# Patient Record
Sex: FEMALE | NOT HISPANIC OR LATINO | Employment: STUDENT | URBAN - METROPOLITAN AREA
[De-identification: names, ages, dates, MRNs, and addresses within clinical notes are randomized per-mention and may not be internally consistent; named-entity substitution may affect disease eponyms.]

---
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
## 2022-03-02 ENCOUNTER — TELEPHONE (OUTPATIENT)
Dept: FAMILY MEDICINE | Facility: CLINIC | Age: 22
End: 2022-03-02
Payer: COMMERCIAL

## 2022-03-02 NOTE — TELEPHONE ENCOUNTER
Needs of attention regarding:  -Wellness (Physical) Visit     Health Maintenance Topics with due status: Overdue       Topic Date Due    ADVANCE CARE PLANNING Never done    DEPRESSION ACTION PLAN Never done    PREVENTIVE CARE VISIT 09/25/2018    PHQ-9 12/09/2021       Communication:  See Letter

## 2022-03-02 NOTE — LETTER
[address] [address]  [phone]  March 2, 2022    Denisse Lange  [address] [facility]   [place] [address]    Dear Denisse,    This is a reminder that you are due for a Wellness Visit (annual full physical).   So that you get your desired appointment time, please call [phone] to schedule this or you can use [employer] if you have an account. If you have had this visit completed elsewhere, or you believe you received this letter in error, please contact us at [phone].    In addition, here is a list of due or overdue Health Maintenance reminders.    Health Maintenance Due   Topic Date Due     Discuss Advance Care Planning  Never done     Depression Action Plan  Never done     Preventive Care Visit  09/25/2018     Depression Assessment  12/09/2021     Best Regards,    Lela Gomez, [facility]          Jenifer Sherwood M.D.

## 2022-03-17 DIAGNOSIS — F32.0 MILD MAJOR DEPRESSION (H): ICD-10-CM

## 2022-03-17 DIAGNOSIS — F41.9 ANXIETY: ICD-10-CM

## 2022-03-18 NOTE — TELEPHONE ENCOUNTER
Routing refill request to provider for review/approval because:  Labs out of range:  phq  Krishan BANKS RN, BSN

## 2022-03-18 NOTE — TELEPHONE ENCOUNTER
Tristian pt with Dr. Sherwood on Friday 6/3/2022.  The patient attends college in [place] and won't be in [place] until late May.  VB

## 2022-03-18 NOTE — TELEPHONE ENCOUNTER
Pt overdue for follow up.  Please assist pt in making appt(s) for the above - ok for virtual.  Needs to recheck in the next 30 days, please.

## 2022-05-27 ENCOUNTER — VIRTUAL VISIT (OUTPATIENT)
Dept: FAMILY MEDICINE | Facility: CLINIC | Age: 22
End: 2022-05-27
Payer: COMMERCIAL

## 2022-05-27 DIAGNOSIS — R45.4 DIFFICULTY CONTROLLING ANGER: Primary | ICD-10-CM

## 2022-05-27 DIAGNOSIS — F32.0 MILD MAJOR DEPRESSION (H): ICD-10-CM

## 2022-05-27 DIAGNOSIS — F41.9 ANXIETY: ICD-10-CM

## 2022-05-27 PROCEDURE — 99214 OFFICE O/P EST MOD 30 MIN: CPT | Mod: 95 | Performed by: FAMILY MEDICINE

## 2022-05-27 ASSESSMENT — ANXIETY QUESTIONNAIRES
IF YOU CHECKED OFF ANY PROBLEMS ON THIS QUESTIONNAIRE, HOW DIFFICULT HAVE THESE PROBLEMS MADE IT FOR YOU TO DO YOUR WORK, TAKE CARE OF THINGS AT HOME, OR GET ALONG WITH OTHER PEOPLE: SOMEWHAT DIFFICULT
1. FEELING NERVOUS, ANXIOUS, OR ON EDGE: MORE THAN HALF THE DAYS
6. BECOMING EASILY ANNOYED OR IRRITABLE: NEARLY EVERY DAY
3. WORRYING TOO MUCH ABOUT DIFFERENT THINGS: MORE THAN HALF THE DAYS
2. NOT BEING ABLE TO STOP OR CONTROL WORRYING: MORE THAN HALF THE DAYS
7. FEELING AFRAID AS IF SOMETHING AWFUL MIGHT HAPPEN: MORE THAN HALF THE DAYS
GAD7 TOTAL SCORE: 13
GAD7 TOTAL SCORE: 13
5. BEING SO RESTLESS THAT IT IS HARD TO SIT STILL: SEVERAL DAYS

## 2022-05-27 ASSESSMENT — PATIENT HEALTH QUESTIONNAIRE - PHQ9
5. POOR APPETITE OR OVEREATING: SEVERAL DAYS
SUM OF ALL RESPONSES TO PHQ QUESTIONS 1-9: 10
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
## 2022-05-27 NOTE — COMMUNITY RESOURCES LIST (ENGLISH)
05/27/2022   [facility] - Outpatient Clinics  Paz Valentino  For questions about this resource list or additional care needs, please contact your primary care clinic or care manager.  Phone: [phone]   Email: N/A   Address: [address] [place] [address]   Hours: N/A        Hotlines and Helplines       Hotline - Crisis help  1   Department of [facility] Hotline Distance: 16.46 miles      COVID-19 Status: Phone/Virtual   [address] [place] [address]  Language: English  Hours: Mon - Sun Open 24 Hours   Phone: (812) 558-3912 Website: https://www.va.gov/directory/guide/facility.asp?UN=8647     2  [facility] Distance: 21.18 miles      COVID-19 Status: Phone/Virtual   [address] [place] [address]  Language: English  Hours: Mon - Sun Open 24 Hours   Phone: (194) 424-7177 Website: http://www.crisisnursery.org          Mental Health       Individual counseling  3  [facility] Distance: 4.65 miles      COVID-19 Status: Phone/Virtual   [address] [place] [address]  Language: English  Hours: Mon - Thu 8:30 AM - 9:00 PM , Fri 8:30 AM - 5:00 PM  Fees: Insurance, Self Pay   Phone: (153) 305-5483 Email: [email or web address] Website: [email or web address]://[employer]/[facility]/     4  [facility] Distance: 5.45 miles      COVID-19 Status: Regular Operations, COVID-19 Status: Phone/Virtual   [address] [place] [address]  Language: English  Hours: Mon - Fri 7:00 AM - 9:00 PM , Sat - Sun 9:00 AM - 5:00 PM  Fees: Insurance, Self Pay, Sliding Fee   Phone: (964) 863-9400 Email: info@[employer] Website: http://www.[employer]     Mental health crisis care  5  [facility] [place] Distance: 16.59 miles      COVID-19 Status: Regular Operations, COVID-19 Status: Phone/Virtual   [address] [place] [address]  Language: English  Hours: Mon - Thu 8:30 AM - 9:00 PM , Fri 8:30 AM - 5:00 PM , Sat 8:00 AM - 4:00 PM  Fees: Insurance, Self Pay, Sliding Fee   Phone: (773) 140-2152 Email: [email or web address] Website: [email or web address]/locations/greg     6  [facility] Distance: 17.03 miles      COVID-19 Status: Phone/Virtual   [address] [place] [address]  Language: English  Hours: Mon - Fri 8:00 AM - 4:30 PM  Fees: Insurance, Self Pay, Sliding Fee   Phone: (914) 693-3498 Email: [email or web address]. Website: [email or web address]://www.[facility]/290/[facility]     Mental health support group  7  [facility] Distance: 14.5 miles      COVID-19 Status: Phone/Virtual   [address] [address] [place] [address]  Language: English  Hours: Mon - Fri 9:00 AM - 5:00 PM Appt. Only  Fees: Free   Phone: (772) 536-3954 Email: [email or web address] Website: http://www.[email or web address].org/     8  [facility] Distance: 16.76 miles      COVID-19 Status: Phone/Virtual   [address] [place] [address]  Language: English  Hours: Tue - Fri 8:00 AM - 7:00 PM , Sat 9:00 AM - 4:00 PM  Fees: Insurance, Self Pay   Phone: (680) 333-6938 Email: 1Ring@[employer] Website: http://www.[employer].I AM AT/          Important Numbers & Websites       Emergency Services   911  City Services   311  Poison Control   (561) 759-8782  Suicide Prevention Lifeline   (124) 910-7825 (TALK)  Child Abuse Hotline   (788) 315-6132 (4-A-Child)  Sexual Assault Hotline   (247) 579-5826 (HOPE)  National Runaway Safeline   (680) 140-4211 (RUNAWAY)  All-Options Talkline   (591) 867-2659  Substance Abuse Referral   (958) 856-5240 (HELP)

## 2022-07-08 ENCOUNTER — VIRTUAL VISIT (OUTPATIENT)
Dept: FAMILY MEDICINE | Facility: CLINIC | Age: 22
End: 2022-07-08
Payer: COMMERCIAL

## 2022-07-08 DIAGNOSIS — F32.0 MILD MAJOR DEPRESSION (H): ICD-10-CM

## 2022-07-08 DIAGNOSIS — R45.4 DIFFICULTY CONTROLLING ANGER: ICD-10-CM

## 2022-07-08 DIAGNOSIS — F41.9 ANXIETY: ICD-10-CM

## 2022-07-08 PROCEDURE — 99214 OFFICE O/P EST MOD 30 MIN: CPT | Mod: 95 | Performed by: FAMILY MEDICINE

## 2022-07-08 ASSESSMENT — ANXIETY QUESTIONNAIRES
6. BECOMING EASILY ANNOYED OR IRRITABLE: SEVERAL DAYS
5. BEING SO RESTLESS THAT IT IS HARD TO SIT STILL: NOT AT ALL
3. WORRYING TOO MUCH ABOUT DIFFERENT THINGS: SEVERAL DAYS
7. FEELING AFRAID AS IF SOMETHING AWFUL MIGHT HAPPEN: NOT AT ALL
GAD7 TOTAL SCORE: 4
GAD7 TOTAL SCORE: 4
1. FEELING NERVOUS, ANXIOUS, OR ON EDGE: SEVERAL DAYS
2. NOT BEING ABLE TO STOP OR CONTROL WORRYING: SEVERAL DAYS
IF YOU CHECKED OFF ANY PROBLEMS ON THIS QUESTIONNAIRE, HOW DIFFICULT HAVE THESE PROBLEMS MADE IT FOR YOU TO DO YOUR WORK, TAKE CARE OF THINGS AT HOME, OR GET ALONG WITH OTHER PEOPLE: SOMEWHAT DIFFICULT

## 2022-07-08 ASSESSMENT — PATIENT HEALTH QUESTIONNAIRE - PHQ9
5. POOR APPETITE OR OVEREATING: NOT AT ALL
SUM OF ALL RESPONSES TO PHQ QUESTIONS 1-9: 8

## 2022-07-08 NOTE — PROGRESS NOTES
"Fide is a 22 year old who is being evaluated via a billable telephone visit.      What phone number would you like to be contacted at? [phone]  How would you like to obtain your AVS? Mail a copy    Assessment & Plan       ICD-10-CM    1. Mild major depression (H)  F32.0 FLUoxetine (PROZAC) 20 MG capsule   2. Anxiety  F41.9 FLUoxetine (PROZAC) 20 MG capsule   3. Difficulty controlling anger  R45.4 FLUoxetine (PROZAC) 20 MG capsule      Continue fluoxetine 60mg po daily. Pt continues to decline counseling - see below. Please, call our clinic or go to the ER immediately if signs or symptoms worsen or fail to improve as anticipated.     Prescription drug management   20 minutes spent on the date of the encounter doing chart review, history and exam, documentation and further activities per the note       MEDICATIONS:  Continue current medications without change  Regular exercise  See Patient Instructions    Return in about 5 months (around 11/23/2022) for depression/anxiety/anger management  follow up, w/ Dr. ORTEZ for 20 min appt, then px after 1/19/2023 .          Jenifer Sherwood MD  [facility] PRIOR [facility] :   Fide is a 22 year old, presenting for the following health issues:  RECHECK      HPI    Anger management with   Depression and Anxiety Follow-Up: doing well - feeling if she gets annoyed or mad, she's not reacting as strongly and take a second to digest and not react as quickly.  Learning to take a deep breath.  Not as motivated to do '\"stuff\" as much - she helps mom around the house, but puts things off around the house until her mom pushes her.   Sleeping in a lot , not being as motivated to do things still.        How are you doing with your depression since your last visit? Improved     How are you doing with your anxiety since your last visit?  Improved     Are you having other symptoms that might be associated with depression or anxiety? No    Have you had a " significant life event? No     Do you have any concerns with your use of alcohol or other drugs? No     We talked last time about doing counseling , but she hasn't found a good counselor as yet since [facility].    Feels her anger management is a bit improved.  Talking a lot with her long-distance boyfriend who is encouraging her to take a step back and take a moment prior to reacting to things.  Feels her anger management is much improved.   She feels like she is trying to react much less aggressively to stressful situations and put herself in the other person's shoes.      She declined referral for counseling again today.  She started working out to help with her stress management - her boyfriend is a workout guru.  She's not working right now , so that is helping.  She's working on communicating her feelings with words.  Hanging out with her friends more.  Taking more time for herself.    Her mom went back to her home country in [place] and has noticed that pt is getting better with her anger management as well.        She'd  Like to stay with her current medication dosage.  She's heading back to school on 8/6/2022. She goes to school at [employer].        Social History     Tobacco Use     Smoking status: Never Smoker     Smokeless tobacco: Never Used     Tobacco comment: no one smokes in home   Substance Use Topics     Alcohol use: No     Drug use: No     PHQ 6/9/2021 5/27/2022 7/8/2022   PHQ-9 Total Score 19 10 8   Q9: Thoughts of better off dead/self-harm past 2 weeks Several days Not at all Not at all   PHQ-A Total Score - - -   PHQ-A Depressed most days in past year - - -   PHQ-A Mood affect on daily activities - - -   PHQ-A Suicide Ideation past 2 weeks - - -   PHQ-A Suicide Ideation past month - - -   PHQ-A Previous suicide attempt - - -     BEN-7 SCORE 6/9/2021 5/27/2022 7/8/2022   Total Score 10 13 4     Last PHQ-9 7/8/2022   1.  Little  interest or pleasure in doing things 3   2.  Feeling down, depressed, or hopeless 1   3.  Trouble falling or staying asleep, or sleeping too much 2   4.  Feeling tired or having little energy 2   5.  Poor appetite or overeating 0   6.  Feeling bad about yourself 0   7.  Trouble concentrating 0   8.  Moving slowly or restless 0   Q9: Thoughts of better off dead/self-harm past 2 weeks 0   PHQ-9 Total Score 8   Difficulty at work, home, or with people Somewhat difficult     BEN-7  7/8/2022   1. Feeling nervous, anxious, or on edge 1   2. Not being able to stop or control worrying 1   3. Worrying too much about different things 1   4. Trouble relaxing 0   5. Being so restless that it is hard to sit still 0   6. Becoming easily annoyed or irritable 1   7. Feeling afraid, as if something awful might happen 0   BEN-7 Total Score 4   If you checked any problems, how difficult have they made it for you to do your work, take care of things at home, or get along with other people? Somewhat difficult       Suicide Assessment Five-step Evaluation and Treatment (SAFE-T)      How many servings of fruits and vegetables do you eat daily?  2-3    On average, how many sweetened beverages do you drink each day (Examples: soda, juice, sweet tea, etc.  Do NOT count diet or artificially sweetened beverages)?   0    How many days per week do you exercise enough to make your heart beat faster? 3 or less    How many minutes a day do you exercise enough to make your heart beat faster? 30 - 60    How many days per week do you miss taking your medication? 0    Patient Active Problem List   Diagnosis     Tonsillar and adenoid hypertrophy     Dislocated elbow- at age 1.5      Anxiety     Mild major depression (H)     Patellar dislocation, left, initial encounter       Current Outpatient Medications   Medication Sig Dispense Refill     FLUoxetine (PROZAC) 20 MG capsule Take 3 capsules (60 mg) by mouth daily 180 capsule 1        No Known  Allergies            Review of Systems   Constitutional, HEENT, cardiovascular, pulmonary, GI, , musculoskeletal, neuro, skin, endocrine and psych systems are negative, except as otherwise noted.      Objective           Vitals:  No vitals were obtained today due to virtual visit.    Physical Exam   healthy, alert and no distress  PSYCH: Alert and oriented times 3; coherent speech, normal   rate and volume, able to articulate logical thoughts, able   to abstract reason, no tangential thoughts, no hallucinations   or delusions  Her affect is normal  RESP: No cough, no audible wheezing, able to talk in full sentences  Remainder of exam unable to be completed due to telephone visit    Office Visit on 02/27/2017   Component Date Value Ref Range Status     Specimen Description 02/27/2017 Throat   Final     Rapid Strep A Screen 02/27/2017    Final                    Value:NEGATIVE: No Group A streptococcal antigen detected by immunoassay, await   culture report.       Micro Report Status 02/27/2017 FINAL 02/27/2017   Final     Specimen Description 02/27/2017 Throat   Final     Culture Micro 02/27/2017 No Beta Streptococcus isolated   Final     Micro Report Status 02/27/2017 FINAL 03/01/2017   Final               Phone call duration: 15 minutes    .  ..

## 2022-10-13 DIAGNOSIS — F32.0 MILD MAJOR DEPRESSION (H): ICD-10-CM

## 2022-10-13 DIAGNOSIS — R45.4 DIFFICULTY CONTROLLING ANGER: ICD-10-CM

## 2022-10-13 DIAGNOSIS — F41.9 ANXIETY: ICD-10-CM

## 2022-10-13 NOTE — TELEPHONE ENCOUNTER
Routing refill request to provider for review/approval because:  Labs out of range:  PHQ    Qty is for 2 months only. Unsure if 90 day supply was meant to be sent in     Pt also needs a 6 month follow up. Please route to team to schedule    Krishan BANKS RN, BSN

## 2022-10-14 NOTE — TELEPHONE ENCOUNTER
90 days only. Needs appt for followup   Can do this at  annual physical.  Ok to use 2 virtual 20 min spots for this prior to end of year.   Please assist pt in making appt(s) for the above.

## 2022-10-20 NOTE — TELEPHONE ENCOUNTER
Left non-detailed message for patient to call back.  Please schedule annual physical  when patient calls back.  (see previous notes for details)    I have been unable to reach this patient by phone.  A letter is being sent to the last known home address.      Thanks Melany

## 2022-12-23 ENCOUNTER — VIRTUAL VISIT (OUTPATIENT)
Dept: FAMILY MEDICINE | Facility: CLINIC | Age: 22
End: 2022-12-23
Payer: COMMERCIAL

## 2022-12-23 DIAGNOSIS — F41.9 ANXIETY: ICD-10-CM

## 2022-12-23 DIAGNOSIS — F32.0 MILD MAJOR DEPRESSION (H): ICD-10-CM

## 2022-12-23 DIAGNOSIS — R45.4 DIFFICULTY CONTROLLING ANGER: ICD-10-CM

## 2022-12-23 PROCEDURE — 99214 OFFICE O/P EST MOD 30 MIN: CPT | Mod: 95 | Performed by: FAMILY MEDICINE

## 2022-12-23 ASSESSMENT — ANXIETY QUESTIONNAIRES
GAD7 TOTAL SCORE: 16
IF YOU CHECKED OFF ANY PROBLEMS ON THIS QUESTIONNAIRE, HOW DIFFICULT HAVE THESE PROBLEMS MADE IT FOR YOU TO DO YOUR WORK, TAKE CARE OF THINGS AT HOME, OR GET ALONG WITH OTHER PEOPLE: VERY DIFFICULT
4. TROUBLE RELAXING: MORE THAN HALF THE DAYS
3. WORRYING TOO MUCH ABOUT DIFFERENT THINGS: MORE THAN HALF THE DAYS
5. BEING SO RESTLESS THAT IT IS HARD TO SIT STILL: MORE THAN HALF THE DAYS
1. FEELING NERVOUS, ANXIOUS, OR ON EDGE: NEARLY EVERY DAY
6. BECOMING EASILY ANNOYED OR IRRITABLE: NEARLY EVERY DAY
7. FEELING AFRAID AS IF SOMETHING AWFUL MIGHT HAPPEN: MORE THAN HALF THE DAYS
8. IF YOU CHECKED OFF ANY PROBLEMS, HOW DIFFICULT HAVE THESE MADE IT FOR YOU TO DO YOUR WORK, TAKE CARE OF THINGS AT HOME, OR GET ALONG WITH OTHER PEOPLE?: VERY DIFFICULT
2. NOT BEING ABLE TO STOP OR CONTROL WORRYING: MORE THAN HALF THE DAYS
7. FEELING AFRAID AS IF SOMETHING AWFUL MIGHT HAPPEN: MORE THAN HALF THE DAYS

## 2022-12-23 ASSESSMENT — PATIENT HEALTH QUESTIONNAIRE - PHQ9
SUM OF ALL RESPONSES TO PHQ QUESTIONS 1-9: 16
SUM OF ALL RESPONSES TO PHQ QUESTIONS 1-9: 16
10. IF YOU CHECKED OFF ANY PROBLEMS, HOW DIFFICULT HAVE THESE PROBLEMS MADE IT FOR YOU TO DO YOUR WORK, TAKE CARE OF THINGS AT HOME, OR GET ALONG WITH OTHER PEOPLE: VERY DIFFICULT

## 2022-12-23 NOTE — PROGRESS NOTES
Fide is a 22 year old who is being evaluated via a billable video visit.      How would you like to obtain your AVS? MyChart  If the video visit is dropped, the invitation should be resent by: Text to cell phone: [phone]  Will anyone else be joining your video visit? No      Assessment & Plan       ICD-10-CM    1. Mild major depression (H)  F32.0 FLUoxetine (PROZAC) 20 MG capsule     Adult Mental Health  Referral      2. Anxiety  F41.9 FLUoxetine (PROZAC) 20 MG capsule     Adult Mental Health  Referral      3. Difficulty controlling anger  R45.4 FLUoxetine (PROZAC) 20 MG capsule     Adult Mental Health  Referral          Ordering of each unique test  Prescription drug management  30 minutes spent on the date of the encounter doing chart review, history and exam, documentation and further activities per the note      MEDICATIONS:   Orders Placed This Encounter   Medications     FLUoxetine (PROZAC) 20 MG capsule     Sig: Take 4 capsules (80 mg) by mouth daily     Dispense:  360 capsule     Refill:  1          - Continue other medications without change  Regular exercise  See Patient Instructions    Return in 3 weeks (on 1/13/2023) for depression/anxiety follow up, as a video visit, w/ Dr. ORTEZ for 40 minute appointment. prior to pt returning to college at [employer].            Jenifer Sherwood MD  [facility]   Fide is a 22 year old, presenting for the following health issues:  Mental health (Discuss mental health issue and check medication and dosage.)      History of Present Illness       Mental Health Follow-up:  Patient presents to follow-up on Depression & Anxiety.Patient's depression since last visit has been:  Bad  The patient is having other symptoms associated with depression.  Patient's anxiety since last visit has been:  Bad  The patient is having other symptoms associated with anxiety.  Any significant life events: relationship  concerns, grief or loss and other  Patient is feeling anxious or having panic attacks.  Patient has no concerns about alcohol or drug use.    She eats 2-3 servings of fruits and vegetables daily.She consumes 2 sweetened beverage(s) daily.She exercises with enough effort to increase her heart rate 10 to 19 minutes per day.  She exercises with enough effort to increase her heart rate 3 or less days per week.   She is taking medications regularly.    Today's PHQ-9         PHQ-9 Total Score: 16    PHQ-9 Q9 Thoughts of better off dead/self-harm past 2 weeks :   Not at all    How difficult have these problems made it for you to do your work, take care of things at home, or get along with other people: Very difficult  Today's BEN-7 Score: 16       Pt desires referral for counseling/therapy -  pt has concerns for and would like testing for borderline personality disorder.  Other people have brought to her attention that she lashes out at them she doesn't realize it and then shuts down.  Her anger has been really bad lately towards friends and family - verbally.   Hasn't been taking care of herself either.  Is on winter break right now from school at [employer].  counseling/therapy -  pt has concerns for and would like testing for borderline personality disorder -- pt ideally would like someone who is licensed in both [place] and [place] as she goes back on 1/14/2023.     Last PHQ-9 12/23/2022   1.  Little interest or pleasure in doing things 3   2.  Feeling down, depressed, or hopeless 3   3.  Trouble falling or staying asleep, or sleeping too much 3   4.  Feeling tired or having little energy 3   5.  Poor appetite or overeating 0   6.  Feeling bad about yourself 2   7.  Trouble concentrating 2   8.  Moving slowly or restless 0   Q9: Thoughts of better off dead/self-harm past 2 weeks 0   PHQ-9 Total Score 16   Difficulty at work, home, or with people -     BEN-7  12/23/2022   1. Feeling nervous, anxious, or on edge 3   2. Not being  able to stop or control worrying 2   3. Worrying too much about different things 2   4. Trouble relaxing 2   5. Being so restless that it is hard to sit still 2   6. Becoming easily annoyed or irritable 3   7. Feeling afraid, as if something awful might happen 2   BEN-7 Total Score 16   If you checked any problems, how difficult have they made it for you to do your work, take care of things at home, or get along with other people? Very difficult       Patient Active Problem List   Diagnosis     Tonsillar and adenoid hypertrophy     Dislocated elbow- at age 1.5      Anxiety     Mild major depression (H)     Patellar dislocation, left, initial encounter     Difficulty controlling anger       Current Outpatient Medications   Medication Sig Dispense Refill     FLUoxetine (PROZAC) 20 MG capsule TAKE 3 CAPSULES(60 MG) BY MOUTH DAILY 180 capsule 0        No Known Allergies        Review of Systems :   Constitutional, HEENT, cardiovascular, pulmonary, GI, , musculoskeletal, neuro, skin, endocrine and psych systems are negative, except as otherwise noted.      Objective           Vitals:  No vitals were obtained today due to virtual visit.    Physical Exam :  GENERAL: Healthy, alert and no distress  EYES: Eyes grossly normal to inspection.  No discharge or erythema, or obvious scleral/conjunctival abnormalities.  RESP: No audible wheeze, cough, or visible cyanosis.  No visible retractions or increased work of breathing.    SKIN: Visible skin clear. No significant rash, abnormal pigmentation or lesions.  NEURO: Cranial nerves grossly intact.  Mentation and speech appropriate for age.  PSYCH: Mentation appears normal, affect normal/bright, judgement and insight intact, normal speech and appearance well-groomed.    Office Visit on 02/27/2017   Component Date Value Ref Range Status     Specimen Description 02/27/2017 Throat   Final     Rapid Strep A Screen 02/27/2017    Final                    Value:NEGATIVE: No Group A  streptococcal antigen detected by immunoassay, await   culture report.       Micro Report Status 02/27/2017 FINAL 02/27/2017   Final     Specimen Description 02/27/2017 Throat   Final     Culture Micro 02/27/2017 No Beta Streptococcus isolated   Final     Micro Report Status 02/27/2017 FINAL 03/01/2017   Final               Video-Visit Details:     Type of service:  Video Visit   28 minutes on video with patient.     Originating Location (pt. Location): Home  Distant Location (provider location):  On-site  Platform used for Video Visit: Wan

## 2022-12-23 NOTE — PATIENT INSTRUCTIONS
[facility]  [address] [place] [address]  Office: [phone]   Fax:    [phone]     Mental health referral placed. Counseling coverage is very insurance driven. An intake person will call pt , but they  can narrow down possible choices by going to [employer].  In the search field type in therapists or counselors in the the [place] metro area and a bunch of therapists choices will come up with their pictures, backrounds, education and their areas of expertise.     Return in 3 weeks (on 1/13/2023) for depression/anxiety follow up, as a video visit, w/ Dr. ORTEZ for 40 minute appointment.   Future Appointments 12/23/2022 - 6/21/2023        Date Visit Type Length Department Provider     1/13/2023 11:20 AM OFFICE VISIT 40 min [facility] Jenifer Sherwood MD    Location Instructions:     [facility] is located at [address] [place] [address], along [address] Free parking is available; access the lot by turning north from [address] onto [address], then west onto [address].                          Thank you so much for doing a video visit with us today. And, again, thank you  for choosing our [facility].  Please contact us with any questions that you may have.   We appreciate the opportunity to serve you now and look forward to supporting your healthcare needs for a long time to come!    Our clinic for the foreseeable future and until further notice , will continue to offer virtual visits, including telephone visits, video visits,  and e-visits, in addition to in person visits,  especially during this period of COVID-19 coronavirus pandemic.  Please call to schedule another one if you need it!        Most Sincerely,     Jenifer Sherwood MD                                              To reach your [facility] care team after hours call:   [phone]  "press #2 \"to speak with your care team\".  This will get you to our clinic instead of routing to [facility]  scheduling.     PLEASE NOTE OUR HOURS HAVE CHANGED secondary to COVID-19 coronavirus pandemic, as we are trying to minimize patient exposure to the virus,  which is now widespread in the state.  These hours may change with very little notice.  We apologize for any inconvenience.       Our current clinic hours are:         Monday- Thursday   7:00am - 6:00pm  in person.      Friday  7:00am- 5:00pm in person                       Saturday and Sunday : Closed to in person and virtual visits            We have telephone and virtual visit times available between 7:00am - 6pm on             Monday-Friday as well.                                                Phone:  [phone]    Our pharmacy hours: Monday  through Friday 8:00am to 5:00pm                        Saturday - 9:00 am to 12 noon         Sunday : Closed.     ###  Please note: at this time we are not accepting any walk-in visits. ###      There is also information available at our web site:  [email or web address]    If your provider ordered any lab tests and you do not receive the results within 10 business days, please call the clinic.    If you need a medication refill please contact your pharmacy.  Please allow 3 business days for your refill to be completed.    Our clinic offers telephone visits and e visits.  Please ask one of your team members to explain more.      Use [employer] (secure email communication and access to your chart) to send your primary care provider a message or make an appointment. Ask someone on your Team how to sign up for [employer].                                   "

## 2023-01-09 ASSESSMENT — ANXIETY QUESTIONNAIRES
8. IF YOU CHECKED OFF ANY PROBLEMS, HOW DIFFICULT HAVE THESE MADE IT FOR YOU TO DO YOUR WORK, TAKE CARE OF THINGS AT HOME, OR GET ALONG WITH OTHER PEOPLE?: SOMEWHAT DIFFICULT
6. BECOMING EASILY ANNOYED OR IRRITABLE: MORE THAN HALF THE DAYS
4. TROUBLE RELAXING: SEVERAL DAYS
7. FEELING AFRAID AS IF SOMETHING AWFUL MIGHT HAPPEN: SEVERAL DAYS
GAD7 TOTAL SCORE: 7
3. WORRYING TOO MUCH ABOUT DIFFERENT THINGS: SEVERAL DAYS
IF YOU CHECKED OFF ANY PROBLEMS ON THIS QUESTIONNAIRE, HOW DIFFICULT HAVE THESE PROBLEMS MADE IT FOR YOU TO DO YOUR WORK, TAKE CARE OF THINGS AT HOME, OR GET ALONG WITH OTHER PEOPLE: SOMEWHAT DIFFICULT
7. FEELING AFRAID AS IF SOMETHING AWFUL MIGHT HAPPEN: SEVERAL DAYS
GAD7 TOTAL SCORE: 7
5. BEING SO RESTLESS THAT IT IS HARD TO SIT STILL: NOT AT ALL
1. FEELING NERVOUS, ANXIOUS, OR ON EDGE: SEVERAL DAYS
2. NOT BEING ABLE TO STOP OR CONTROL WORRYING: SEVERAL DAYS

## 2023-01-09 ASSESSMENT — PATIENT HEALTH QUESTIONNAIRE - PHQ9: SUM OF ALL RESPONSES TO PHQ QUESTIONS 1-9: 12

## 2023-01-13 ENCOUNTER — VIRTUAL VISIT (OUTPATIENT)
Dept: FAMILY MEDICINE | Facility: CLINIC | Age: 23
End: 2023-01-13
Payer: COMMERCIAL

## 2023-01-13 DIAGNOSIS — F41.9 ANXIETY: ICD-10-CM

## 2023-01-13 DIAGNOSIS — R45.4 DIFFICULTY CONTROLLING ANGER: Primary | ICD-10-CM

## 2023-01-13 DIAGNOSIS — F32.0 MILD MAJOR DEPRESSION (H): ICD-10-CM

## 2023-01-13 PROCEDURE — 99213 OFFICE O/P EST LOW 20 MIN: CPT | Mod: 95 | Performed by: FAMILY MEDICINE

## 2023-01-13 ASSESSMENT — ANXIETY QUESTIONNAIRES
8. IF YOU CHECKED OFF ANY PROBLEMS, HOW DIFFICULT HAVE THESE MADE IT FOR YOU TO DO YOUR WORK, TAKE CARE OF THINGS AT HOME, OR GET ALONG WITH OTHER PEOPLE?: SOMEWHAT DIFFICULT
1. FEELING NERVOUS, ANXIOUS, OR ON EDGE: SEVERAL DAYS
4. TROUBLE RELAXING: SEVERAL DAYS
3. WORRYING TOO MUCH ABOUT DIFFERENT THINGS: MORE THAN HALF THE DAYS
6. BECOMING EASILY ANNOYED OR IRRITABLE: MORE THAN HALF THE DAYS
2. NOT BEING ABLE TO STOP OR CONTROL WORRYING: MORE THAN HALF THE DAYS
GAD7 TOTAL SCORE: 9
GAD7 TOTAL SCORE: 9
IF YOU CHECKED OFF ANY PROBLEMS ON THIS QUESTIONNAIRE, HOW DIFFICULT HAVE THESE PROBLEMS MADE IT FOR YOU TO DO YOUR WORK, TAKE CARE OF THINGS AT HOME, OR GET ALONG WITH OTHER PEOPLE: SOMEWHAT DIFFICULT
7. FEELING AFRAID AS IF SOMETHING AWFUL MIGHT HAPPEN: SEVERAL DAYS
GAD7 TOTAL SCORE: 9
7. FEELING AFRAID AS IF SOMETHING AWFUL MIGHT HAPPEN: SEVERAL DAYS
5. BEING SO RESTLESS THAT IT IS HARD TO SIT STILL: NOT AT ALL

## 2023-01-13 ASSESSMENT — PATIENT HEALTH QUESTIONNAIRE - PHQ9
10. IF YOU CHECKED OFF ANY PROBLEMS, HOW DIFFICULT HAVE THESE PROBLEMS MADE IT FOR YOU TO DO YOUR WORK, TAKE CARE OF THINGS AT HOME, OR GET ALONG WITH OTHER PEOPLE: SOMEWHAT DIFFICULT
SUM OF ALL RESPONSES TO PHQ QUESTIONS 1-9: 13
SUM OF ALL RESPONSES TO PHQ QUESTIONS 1-9: 13

## 2023-01-13 NOTE — PROGRESS NOTES
Fide is a 22 year old who is being evaluated via a billable video visit.      How would you like to obtain your AVS? MyChart  If the video visit is dropped, the invitation should be resent by: Text to cell phone: [phone]  Will anyone else be joining your video visit? No          Assessment & Plan :       ICD-10-CM    1. Difficulty controlling anger  R45.4       2. Mild major depression (H)  F32.0       3. Anxiety  F41.9          Please,  continue your current medications and/or supplements and follow up as we discussed at your last visit.      Prescription drug management  14 minutes spent on the date of the encounter doing chart review, history and exam, documentation and further activities per the note       MEDICATIONS:  Continue current medications without change  Regular exercise  See Patient Instructions    Return in 2 months (on 3/13/2023) for mood and anger , w/ Dr. ORTEZ for 20 minute appointment, as a video visit.           Jenifer Sherwood MD  [facility]   Fide is a 22 year old, presenting for the following health issues:  RECHECK (Mood)    Leaves for school tomorrow am - [employer].   Things have been better.  Keeping busy.  Hasn't been able to get into therapy/counseling as yet.  Calling her insurance to find out who is covered and looking into free counseling services at school as well.      Currently on fluxoetine 80mg daily - no problems or side effects. No anger outbursts.      Things at home good.   History of Present Illness       Mental Health Follow-up:  Patient presents to follow-up on Depression & Anxiety.Patient's depression since last visit has been:  Medium  The patient is not having other symptoms associated with depression.  Patient's anxiety since last visit has been:  Medium  The patient is having other symptoms associated with anxiety.  Any significant life events: No  Patient is not feeling anxious or having panic attacks.  Patient has  no concerns about alcohol or drug use.    She eats 2-3 servings of fruits and vegetables daily.She consumes 2 sweetened beverage(s) daily.She exercises with enough effort to increase her heart rate 9 or less minutes per day.  She exercises with enough effort to increase her heart rate 3 or less days per week.   She is taking medications regularly.    Today's PHQ-9        PHQ-9 Total Score:    PHQ-9 Q9 Thoughts of better off dead/self-harm past 2 weeks :   Not at all    How difficult have these problems made it for you to do your work, take care of things at home, or get along with other people: Somewhat difficult           Social History     Tobacco Use     Smoking status: Never     Smokeless tobacco: Never     Tobacco comments:     no one smokes in home   Vaping Use     Vaping Use: Never used   Substance Use Topics     Alcohol use: No     Drug use: No     PHQ 12/23/2022 1/9/2023 1/13/2023   PHQ-9 Total Score 16 12 13   Q9: Thoughts of better off dead/self-harm past 2 weeks Not at all Not at all Not at all   PHQ-A Total Score - - -   PHQ-A Depressed most days in past year - - -   PHQ-A Mood affect on daily activities - - -   PHQ-A Suicide Ideation past 2 weeks - - -   PHQ-A Suicide Ideation past month - - -   PHQ-A Previous suicide attempt - - -     BEN-7 SCORE 12/23/2022 1/9/2023 1/13/2023   Total Score 16 (severe anxiety) 7 (mild anxiety) 9 (mild anxiety)   Total Score 16 7 9     Last PHQ-9 1/13/2023   1.  Little interest or pleasure in doing things 2   2.  Feeling down, depressed, or hopeless 1   3.  Trouble falling or staying asleep, or sleeping too much 3   4.  Feeling tired or having little energy 3   5.  Poor appetite or overeating 1   6.  Feeling bad about yourself 1   7.  Trouble concentrating 2   8.  Moving slowly or restless 0   Q9: Thoughts of better off dead/self-harm past 2 weeks 0   PHQ-9 Total Score 13   Difficulty at work, home, or with people -     BEN-7  1/13/2023   1. Feeling nervous, anxious, or  on edge 1   2. Not being able to stop or control worrying 2   3. Worrying too much about different things 2   4. Trouble relaxing 1   5. Being so restless that it is hard to sit still 0   6. Becoming easily annoyed or irritable 2   7. Feeling afraid, as if something awful might happen 1   BEN-7 Total Score 9   If you checked any problems, how difficult have they made it for you to do your work, take care of things at home, or get along with other people? Somewhat difficult       Suicide Assessment Five-step Evaluation and Treatment (SAFE-T)    .  Patient Active Problem List   Diagnosis     Tonsillar and adenoid hypertrophy     Dislocated elbow- at age 1.5      Anxiety     Mild major depression (H)     Patellar dislocation, left, initial encounter     Difficulty controlling anger       Current Outpatient Medications   Medication Sig Dispense Refill     FLUoxetine (PROZAC) 20 MG capsule Take 4 capsules (80 mg) by mouth daily 360 capsule 1        No Known Allergies    Review of Systems :   Constitutional, HEENT, cardiovascular, pulmonary, GI, , musculoskeletal, neuro, skin, endocrine and psych systems are negative, except as otherwise noted.      Objective  :      Vitals:  No vitals were obtained today due to virtual visit.    Physical Exam :   GENERAL: Healthy, alert and no distress  EYES: Eyes grossly normal to inspection.  No discharge or erythema, or obvious scleral/conjunctival abnormalities.  RESP: No audible wheeze, cough, or visible cyanosis.  No visible retractions or increased work of breathing.    SKIN: Visible skin clear. No significant rash, abnormal pigmentation or lesions.  NEURO: Cranial nerves grossly intact.  Mentation and speech appropriate for age.  PSYCH: Mentation appears normal, affect normal/bright, judgement and insight intact, normal speech and appearance well-groomed.            Video-Visit Details:     Type of service:  Video Visit   Video Start Time: 11:46am  Video End Time:11:55  JESUS MANUEL    Originating Location (pt. Location): Home    Distant Location (provider location):  On-site  Platform used for Video Visit: Wan

## 2023-01-14 ASSESSMENT — ANXIETY QUESTIONNAIRES: GAD7 TOTAL SCORE: 9

## 2023-01-14 ASSESSMENT — PATIENT HEALTH QUESTIONNAIRE - PHQ9: SUM OF ALL RESPONSES TO PHQ QUESTIONS 1-9: 13

## 2023-03-17 ENCOUNTER — VIRTUAL VISIT (OUTPATIENT)
Dept: FAMILY MEDICINE | Facility: CLINIC | Age: 23
End: 2023-03-17
Payer: COMMERCIAL

## 2023-03-17 DIAGNOSIS — F32.0 MILD MAJOR DEPRESSION (H): ICD-10-CM

## 2023-03-17 DIAGNOSIS — F41.9 ANXIETY: Primary | ICD-10-CM

## 2023-03-17 DIAGNOSIS — R45.4 DIFFICULTY CONTROLLING ANGER: ICD-10-CM

## 2023-03-17 PROCEDURE — 99213 OFFICE O/P EST LOW 20 MIN: CPT | Mod: VID | Performed by: FAMILY MEDICINE

## 2023-03-17 ASSESSMENT — ANXIETY QUESTIONNAIRES
2. NOT BEING ABLE TO STOP OR CONTROL WORRYING: MORE THAN HALF THE DAYS
3. WORRYING TOO MUCH ABOUT DIFFERENT THINGS: MORE THAN HALF THE DAYS
7. FEELING AFRAID AS IF SOMETHING AWFUL MIGHT HAPPEN: SEVERAL DAYS
1. FEELING NERVOUS, ANXIOUS, OR ON EDGE: MORE THAN HALF THE DAYS
GAD7 TOTAL SCORE: 8
IF YOU CHECKED OFF ANY PROBLEMS ON THIS QUESTIONNAIRE, HOW DIFFICULT HAVE THESE PROBLEMS MADE IT FOR YOU TO DO YOUR WORK, TAKE CARE OF THINGS AT HOME, OR GET ALONG WITH OTHER PEOPLE: SOMEWHAT DIFFICULT
5. BEING SO RESTLESS THAT IT IS HARD TO SIT STILL: NOT AT ALL
6. BECOMING EASILY ANNOYED OR IRRITABLE: SEVERAL DAYS
GAD7 TOTAL SCORE: 8

## 2023-03-17 ASSESSMENT — PATIENT HEALTH QUESTIONNAIRE - PHQ9
5. POOR APPETITE OR OVEREATING: NOT AT ALL
SUM OF ALL RESPONSES TO PHQ QUESTIONS 1-9: 12

## 2023-03-17 NOTE — PROGRESS NOTES
Fide is a 23 year old who is being evaluated via a billable video visit.      How would you like to obtain your AVS? MyChart  If the video visit is dropped, the invitation should be resent by: Text to cell phone: [phone]  Will anyone else be joining your video visit? No        Assessment & Plan :       ICD-10-CM    1. Anxiety  F41.9       2. Difficulty controlling anger  R45.4       3. Mild major depression (H)  F32.0           Return in 2 months (on 5/23/2023) for Physical/Preventative Visit, depression/anxiety follow up, w/ Dr. ORTEZ for 40 minute appointment.     Future Appointments 3/17/2023 - 9/13/2023      Date Visit Type Length Department Provider     5/23/2023 10:40 AM PREVENTATIVE ADULT 40 min  FAMILY PRACTICE Jenifer Sherwood MD    Location Instructions:     [facility] is located at [address] [place] [address], along [address] Free parking is available; access the lot by turning north from [address] onto [address], then west onto [facility] [address].                  Ordering of each unique test  Prescription drug management  25 minutes spent on the date of the encounter doing chart review, history and exam, documentation and further activities per the note       MEDICATIONS:  Continue current medications without change  Regular exercise  See Patient Instructions    Return in 2 months (on 5/23/2023) for Physical/Preventative Visit, depression/anxiety follow up, w/ Dr. ORTEZ for 40 minute appointment.        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    Looking into doing counseling at school. Hasn't done that yet.  Getting some weird zinging feelings in her scalp for a  "second and then its gone. \"like a reset\" then its gone - not painful.  Doesn't bother her.      Is sleeping really well.   Still no problems or side effects . No anger outbursts.  Pt feels really good where she is and doesn't want to add any medications or change anything.         From last visit 1/13/2023 - Things have been better.  Keeping busy.  Hasn't been able to get into therapy/counseling as yet.  Calling her insurance to find out who is covered and looking into free counseling services at school as well.       Currently on fluxoetine 80mg daily - no problems or side effects. No anger outbursts.       Things at home good. Giving herself more alone time.   HPI     Depression and Anxiety Follow-Up:     How are you doing with your depression since your last visit? No change    How are you doing with your anxiety since your last visit?  No change    Are you having other symptoms that might be associated with depression or anxiety? No    Have you had a significant life event? No     Do you have any concerns with your use of alcohol or other drugs? No    Social History     Tobacco Use     Smoking status: Never     Smokeless tobacco: Never     Tobacco comments:     no one smokes in home   Vaping Use     Vaping Use: Never used   Substance Use Topics     Alcohol use: No     Drug use: No     PHQ 1/13/2023 3/12/2023 3/17/2023   PHQ-9 Total Score 13 11 12   Q9: Thoughts of better off dead/self-harm past 2 weeks Not at all Not at all Not at all   PHQ-A Total Score - - -   PHQ-A Depressed most days in past year - - -   PHQ-A Mood affect on daily activities - - -   PHQ-A Suicide Ideation past 2 weeks - - -   PHQ-A Suicide Ideation past month - - -   PHQ-A Previous suicide attempt - - -     BEN-7 SCORE 1/13/2023 3/12/2023 3/17/2023   Total Score 9 (mild anxiety) 5 (mild anxiety) -   Total Score 9 5 8     Last PHQ-9 3/17/2023   1.  Little interest or pleasure in doing things 2   2.  Feeling down, depressed, or hopeless 1   3. "  Trouble falling or staying asleep, or sleeping too much 2   4.  Feeling tired or having little energy 2   5.  Poor appetite or overeating 0   6.  Feeling bad about yourself 0   7.  Trouble concentrating 2   8.  Moving slowly or restless 3   Q9: Thoughts of better off dead/self-harm past 2 weeks 0   PHQ-9 Total Score 12   Difficulty at work, home, or with people Somewhat difficult     BEN-7  3/17/2023   1. Feeling nervous, anxious, or on edge 2   2. Not being able to stop or control worrying 2   3. Worrying too much about different things 2   4. Trouble relaxing 0   5. Being so restless that it is hard to sit still 0   6. Becoming easily annoyed or irritable 1   7. Feeling afraid, as if something awful might happen 1   BEN-7 Total Score 8   If you checked any problems, how difficult have they made it for you to do your work, take care of things at home, or get along with other people? Somewhat difficult       Suicide Assessment Five-step Evaluation and Treatment (SAFE-T)    Patient Active Problem List   Diagnosis     Tonsillar and adenoid hypertrophy     Dislocated elbow- at age 1.5      Anxiety     Mild major depression (H)     Patellar dislocation, left, initial encounter     Difficulty controlling anger       Current Outpatient Medications   Medication Sig Dispense Refill     FLUoxetine (PROZAC) 20 MG capsule Take 4 capsules (80 mg) by mouth daily 360 capsule 1        No Known Allergies         Review of Systems :   Constitutional, HEENT, cardiovascular, pulmonary, GI, , musculoskeletal, neuro, skin, endocrine and psych systems are negative, except as otherwise noted.      Objective           Vitals:  No vitals were obtained today due to virtual visit.    Physical Exam   GENERAL: Healthy, alert and no distress  EYES: Eyes grossly normal to inspection.  No discharge or erythema, or obvious scleral/conjunctival abnormalities.  RESP: No audible wheeze, cough, or visible cyanosis.  No visible retractions or  increased work of breathing.    SKIN: Visible skin clear. No significant rash, abnormal pigmentation or lesions.  NEURO: Cranial nerves grossly intact.  Mentation and speech appropriate for age.  PSYCH: Mentation appears normal, affect normal/bright, judgement and insight intact, normal speech and appearance well-groomed.                Video-Visit Details    Type of service:  Video Visit   Video Start Time: 5:13PM  Video End Time:5:31PM    Total time on video : 18 minutes     Originating Location (pt. Location): Home  Distant Location (provider location):  On-site  Platform used for Video Visit: Wan

## 2023-03-17 NOTE — PATIENT INSTRUCTIONS
" [facility]  [address] [place] [address]  Office: [phone]   Fax:    [phone]     [employer] Podcast on Spot[employer].         Return in 2 months (on 5/23/2023) for Physical/Preventative Visit, depression/anxiety follow up, w/ Dr. ORTEZ for 40 minute appointment.    Future Appointments 3/17/2023 - 9/13/2023        Date Visit Type Length Department Provider     5/23/2023 10:40 AM PREVENTATIVE ADULT 40 min [facility] Jenifer Sherwood MD    Location Instructions:     [facility] is located at [address], along [address]. Free parking is available; access [address], then west onto [address].                               Thank you so much for doing a video visit with us today. And, again, thank you  for choosing our [facility].  Please contact us with any questions that you may have.   We appreciate the opportunity to serve you now and look forward to supporting your healthcare needs for a long time to come!    Our clinic for the foreseeable future and until further notice , will continue to offer virtual visits, including telephone visits, video visits,  and e-visits, in addition to in person visits,  especially during this period of COVID-19 coronavirus pandemic.  Please call to schedule another one if you need it!        Most Sincerely,     Jenifer Sherwood MD                                              To reach your [facility] care team after hours call:   [phone] press #[phone] \"to speak with your care team\".  This will get you to our clinic instead of routing to central [facility]  scheduling.     PLEASE NOTE OUR HOURS HAVE CHANGED secondary to COVID-19 coronavirus pandemic, as we are trying to minimize patient exposure to the virus,  which is now widespread in the " state.  These hours may change with very little notice.  We apologize for any inconvenience.       Our current clinic hours are:         Monday- Thursday   7:00am - 6:00pm  in person.      Friday  7:00am- 5:00pm in person                       Saturday and Sunday : Closed to in person and virtual visits            We have telephone and virtual visit times available between 7:00am - 6pm on             Monday-Friday as well.                                                Phone:  [phone]    Our pharmacy hours: Monday  through Friday 8:00am to 5:00pm                        Saturday - 9:00 am to 12 noon         Sunday : Closed.     ###  Please note: at this time we are not accepting any walk-in visits. ###      There is also information available at our web site:  [email or web address]    If your provider ordered any lab tests and you do not receive the results within 10 business days, please call the clinic.    If you need a medication refill please contact your pharmacy.  Please allow 3 business days for your refill to be completed.    Our clinic offers telephone visits and e visits.  Please ask one of your team members to explain more.      Use [employer] (secure email communication and access to your chart) to send your primary care provider a message or make an appointment. Ask someone on your Team how to sign up for [employer].

## 2023-04-15 ENCOUNTER — HEALTH MAINTENANCE LETTER (OUTPATIENT)
Age: 23
End: 2023-04-15

## 2023-05-22 ASSESSMENT — ENCOUNTER SYMPTOMS
DYSURIA: 0
SHORTNESS OF BREATH: 0
NERVOUS/ANXIOUS: 1
DIZZINESS: 1
NAUSEA: 0
CHILLS: 0
HEADACHES: 1
ARTHRALGIAS: 0
JOINT SWELLING: 0
FREQUENCY: 0
CONSTIPATION: 0
BREAST MASS: 0
WEAKNESS: 0
HEMATOCHEZIA: 0
ABDOMINAL PAIN: 0
HEARTBURN: 0
HEMATURIA: 0
FEVER: 0
COUGH: 0
MYALGIAS: 0
PALPITATIONS: 0
EYE PAIN: 0
DIARRHEA: 0
PARESTHESIAS: 0
SORE THROAT: 0

## 2023-05-22 ASSESSMENT — PATIENT HEALTH QUESTIONNAIRE - PHQ9
SUM OF ALL RESPONSES TO PHQ QUESTIONS 1-9: 12
SUM OF ALL RESPONSES TO PHQ QUESTIONS 1-9: 12
10. IF YOU CHECKED OFF ANY PROBLEMS, HOW DIFFICULT HAVE THESE PROBLEMS MADE IT FOR YOU TO DO YOUR WORK, TAKE CARE OF THINGS AT HOME, OR GET ALONG WITH OTHER PEOPLE: EXTREMELY DIFFICULT

## 2023-05-23 ENCOUNTER — OFFICE VISIT (OUTPATIENT)
Dept: FAMILY MEDICINE | Facility: CLINIC | Age: 23
End: 2023-05-23
Payer: COMMERCIAL

## 2023-05-23 VITALS
DIASTOLIC BLOOD PRESSURE: 73 MMHG | OXYGEN SATURATION: 100 % | SYSTOLIC BLOOD PRESSURE: 106 MMHG | WEIGHT: 126.2 LBS | RESPIRATION RATE: 14 BRPM | HEART RATE: 77 BPM | TEMPERATURE: 98.4 F | HEIGHT: 64 IN | BODY MASS INDEX: 21.54 KG/M2

## 2023-05-23 DIAGNOSIS — Z23 NEED FOR COVID-19 VACCINE: ICD-10-CM

## 2023-05-23 DIAGNOSIS — Z00.01 ENCOUNTER FOR ROUTINE ADULT MEDICAL EXAM WITH ABNORMAL FINDINGS: Primary | ICD-10-CM

## 2023-05-23 DIAGNOSIS — Z30.011 ENCOUNTER FOR INITIAL PRESCRIPTION OF CONTRACEPTIVE PILLS: ICD-10-CM

## 2023-05-23 DIAGNOSIS — F33.0 MILD RECURRENT MAJOR DEPRESSION (H): ICD-10-CM

## 2023-05-23 DIAGNOSIS — Z11.3 SCREEN FOR STD (SEXUALLY TRANSMITTED DISEASE): ICD-10-CM

## 2023-05-23 DIAGNOSIS — R45.4 DIFFICULTY CONTROLLING ANGER: ICD-10-CM

## 2023-05-23 DIAGNOSIS — N94.6 DYSMENORRHEA: ICD-10-CM

## 2023-05-23 DIAGNOSIS — F41.9 ANXIETY: ICD-10-CM

## 2023-05-23 DIAGNOSIS — Z12.4 CERVICAL CANCER SCREENING: ICD-10-CM

## 2023-05-23 PROBLEM — S83.005A PATELLAR DISLOCATION, LEFT, INITIAL ENCOUNTER: Status: ACTIVE | Noted: 2021-12-23

## 2023-05-23 LAB
HCG UR QL: NEGATIVE
HGB BLD-MCNC: 13.3 G/DL (ref 11.7–15.7)

## 2023-05-23 PROCEDURE — 99395 PREV VISIT EST AGE 18-39: CPT | Mod: 25 | Performed by: FAMILY MEDICINE

## 2023-05-23 PROCEDURE — 87491 CHLMYD TRACH DNA AMP PROBE: CPT | Performed by: FAMILY MEDICINE

## 2023-05-23 PROCEDURE — 81025 URINE PREGNANCY TEST: CPT | Performed by: FAMILY MEDICINE

## 2023-05-23 PROCEDURE — 85018 HEMOGLOBIN: CPT | Performed by: FAMILY MEDICINE

## 2023-05-23 PROCEDURE — 96127 BRIEF EMOTIONAL/BEHAV ASSMT: CPT | Performed by: FAMILY MEDICINE

## 2023-05-23 PROCEDURE — 91313 COVID-19 BIVALENT 18+ (MODERNA): CPT | Performed by: FAMILY MEDICINE

## 2023-05-23 PROCEDURE — G0145 SCR C/V CYTO,THINLAYER,RESCR: HCPCS | Performed by: FAMILY MEDICINE

## 2023-05-23 PROCEDURE — 99214 OFFICE O/P EST MOD 30 MIN: CPT | Mod: 25 | Performed by: FAMILY MEDICINE

## 2023-05-23 PROCEDURE — 36415 COLL VENOUS BLD VENIPUNCTURE: CPT | Performed by: FAMILY MEDICINE

## 2023-05-23 PROCEDURE — 87591 N.GONORRHOEAE DNA AMP PROB: CPT | Performed by: FAMILY MEDICINE

## 2023-05-23 PROCEDURE — 0134A COVID-19 BIVALENT 18+ (MODERNA): CPT | Performed by: FAMILY MEDICINE

## 2023-05-23 RX ORDER — LEVONORGESTREL/ETHIN.ESTRADIOL 0.1-0.02MG
1 TABLET ORAL DAILY
Qty: 84 TABLET | Refills: 3 | Status: SHIPPED | OUTPATIENT
Start: 2023-05-23 | End: 2023-08-10

## 2023-05-23 RX ORDER — ESCITALOPRAM OXALATE 5 MG/1
5 TABLET ORAL DAILY
Qty: 30 TABLET | Refills: 5 | Status: SHIPPED | OUTPATIENT
Start: 2023-05-23 | End: 2023-06-28

## 2023-05-23 ASSESSMENT — ANXIETY QUESTIONNAIRES
1. FEELING NERVOUS, ANXIOUS, OR ON EDGE: SEVERAL DAYS
3. WORRYING TOO MUCH ABOUT DIFFERENT THINGS: MORE THAN HALF THE DAYS
GAD7 TOTAL SCORE: 9
8. IF YOU CHECKED OFF ANY PROBLEMS, HOW DIFFICULT HAVE THESE MADE IT FOR YOU TO DO YOUR WORK, TAKE CARE OF THINGS AT HOME, OR GET ALONG WITH OTHER PEOPLE?: VERY DIFFICULT
7. FEELING AFRAID AS IF SOMETHING AWFUL MIGHT HAPPEN: SEVERAL DAYS
2. NOT BEING ABLE TO STOP OR CONTROL WORRYING: SEVERAL DAYS
GAD7 TOTAL SCORE: 9
7. FEELING AFRAID AS IF SOMETHING AWFUL MIGHT HAPPEN: SEVERAL DAYS
4. TROUBLE RELAXING: SEVERAL DAYS
GAD7 TOTAL SCORE: 9
IF YOU CHECKED OFF ANY PROBLEMS ON THIS QUESTIONNAIRE, HOW DIFFICULT HAVE THESE PROBLEMS MADE IT FOR YOU TO DO YOUR WORK, TAKE CARE OF THINGS AT HOME, OR GET ALONG WITH OTHER PEOPLE: VERY DIFFICULT
6. BECOMING EASILY ANNOYED OR IRRITABLE: MORE THAN HALF THE DAYS
5. BEING SO RESTLESS THAT IT IS HARD TO SIT STILL: SEVERAL DAYS

## 2023-05-23 ASSESSMENT — ENCOUNTER SYMPTOMS
BREAST MASS: 0
DYSURIA: 0
NAUSEA: 0
HEADACHES: 1
EYE PAIN: 0
NERVOUS/ANXIOUS: 1
WEAKNESS: 0
CONSTIPATION: 0
HEMATURIA: 0
SHORTNESS OF BREATH: 0
JOINT SWELLING: 0
HEMATOCHEZIA: 0
PARESTHESIAS: 0
ARTHRALGIAS: 0
CHILLS: 0
DIZZINESS: 1
COUGH: 0
HEARTBURN: 0
DIARRHEA: 0
FEVER: 0
FREQUENCY: 0
ABDOMINAL PAIN: 0
SORE THROAT: 0
MYALGIAS: 0
PALPITATIONS: 0

## 2023-05-23 NOTE — PROGRESS NOTES
"   SUBJECTIVE:   CC: Fide is an 23 year old who presents for preventive health visit. and the following other medical problems:     1. Encounter for routine adult medical exam with abnormal findings    2. Cervical cancer screening    3. Need for COVID-19 vaccine    4. Screen for STD (sexually transmitted disease)    5. Anxiety- desires to switch from fluoxetine - not effective for her - will try escitalopram instead     6. Difficulty controlling anger    7. Dysmenorrhea    8. Encounter for initial prescription of contraceptive pills    9. Mild recurrent major depression (H)    Answers for HPI/ROS submitted by the patient on 5/22/2023  If you checked off any problems, how difficult have these problems made it for you to do your work, take care of things at home, or get along with other people?: Extremely difficult  PHQ9 TOTAL SCORE: 12  BEN 7 TOTAL SCORE: 9            5/23/2023     9:45 AM   Additional Questions   Roomed by Lynette Addison   Accompanied by Self   Patient has been advised of split billing requirements and indicates understanding: Yes  Healthy Habits:     Getting at least 3 servings of Calcium per day:  NO    Bi-annual eye exam:  Yes    Dental care twice a year:  Yes    Sleep apnea or symptoms of sleep apnea:  None    Diet:  Regular (no restrictions)    Frequency of exercise:  2-3 days/week    Duration of exercise:  45-60 minutes    Taking medications regularly:  Yes    Medication side effects:  None    PHQ-2 Total Score: 4    Additional concerns today:  No              Depression and Anxiety Follow-Up    How are you doing with your depression since your last visit? Worsened, Fide would like to discuss a possible medication change.    How are you doing with your anxiety since your last visit?  Worsened, \"   \"    Are you having other symptoms that might be associated with depression or anxiety? No    Have you had a significant life event? No     Do you have any concerns with your use of alcohol or other " drugs? No    Social History     Tobacco Use     Smoking status: Never     Smokeless tobacco: Never     Tobacco comments:     no one smokes in home   Vaping Use     Vaping status: Never Used   Substance Use Topics     Alcohol use: Yes     Drug use: No         3/12/2023    11:50 PM 3/17/2023     4:38 PM 5/22/2023     8:58 PM   PHQ   PHQ-9 Total Score 11 12 12   Q9: Thoughts of better off dead/self-harm past 2 weeks Not at all Not at all Not at all         1/13/2023    10:39 AM 3/12/2023    11:48 PM 3/17/2023     4:38 PM   BEN-7 SCORE   Total Score 9 (mild anxiety) 5 (mild anxiety)    Total Score 9 5 8         5/22/2023     8:58 PM   Last PHQ-9   1.  Little interest or pleasure in doing things 2   2.  Feeling down, depressed, or hopeless 2   3.  Trouble falling or staying asleep, or sleeping too much 2   4.  Feeling tired or having little energy 2   5.  Poor appetite or overeating 1   6.  Feeling bad about yourself 1   7.  Trouble concentrating 2   8.  Moving slowly or restless 0   Q9: Thoughts of better off dead/self-harm past 2 weeks 0   PHQ-9 Total Score 12         3/17/2023     4:38 PM   BEN-7    1. Feeling nervous, anxious, or on edge 2   2. Not being able to stop or control worrying 2   3. Worrying too much about different things 2   4. Trouble relaxing 0   5. Being so restless that it is hard to sit still 0   6. Becoming easily annoyed or irritable 1   7. Feeling afraid, as if something awful might happen 1   BEN-7 Total Score 8   If you checked any problems, how difficult have they made it for you to do your work, take care of things at home, or get along with other people? Somewhat difficult       Suicide Assessment Five-step Evaluation and Treatment (SAFE-T)      Have you ever done Advance Care Planning? (For example, a Health Directive, POLST, or a discussion with a medical provider or your loved ones about your wishes): No, advance care planning information given to patient to review.  Patient plans to  discuss their wishes with loved ones or provider.      Social History     Tobacco Use     Smoking status: Never     Smokeless tobacco: Never     Tobacco comments:     no one smokes in home   Vaping Use     Vaping status: Never Used   Substance Use Topics     Alcohol use: Yes           2023     9:01 PM   Alcohol Use   Prescreen: >3 drinks/day or >7 drinks/week? No     Reviewed orders with patient.  Reviewed health maintenance and updated orders accordingly - Yes  Lab work is in process  Labs reviewed in EPIC  BP Readings from Last 3 Encounters:   23 106/73   21 104/71   19 100/60 (14 %, Z = -1.08 /  28 %, Z = -0.58)*     *BP percentiles are based on the 2017 AAP Clinical Practice Guideline for girls    Wt Readings from Last 3 Encounters:   23 57.2 kg (126 lb 3.2 oz)   21 62.2 kg (137 lb 1.6 oz) (65 %, Z= 0.38)*   19 56.2 kg (124 lb) (51 %, Z= 0.02)*     * Growth percentiles are based on [facility] (Girls, 2-20 Years) data.                  Patient Active Problem List   Diagnosis     Anxiety     Mild major depression (H)     Patellar dislocation, left, initial encounter- first visit to clinic = 2021 , had recurrent of same as a child      Difficulty controlling anger     Past Surgical History:   Procedure Laterality Date     EXCISE LESION NECK  2012    Procedure: EXCISE LESION NECK;  Excision Lipoma Posterior Neck ;  Surgeon: Aureliano Henderson MD;  Location: [facility] OR     TONSILLECTOMY         Social History     Tobacco Use     Smoking status: Never     Smokeless tobacco: Never     Tobacco comments:     no one smokes in home   Vaping Use     Vaping status: Never Used   Substance Use Topics     Alcohol use: Yes     Family History   Problem Relation Age of Onset     Cerebrovascular Disease Paternal Grandfather         HEMORRHAGIC STROKE FROM ANEURYSM AT AGE 55 -      Family History Negative Mother      Diabetes Maternal Grandmother          Current Outpatient  Medications   Medication Sig Dispense Refill     escitalopram (LEXAPRO) 5 MG tablet Take 1 tablet (5 mg) by mouth daily 30 tablet 5     levonorgestrel-ethinyl estradiol (AVIANE) 0.1-20 MG-MCG tablet Take 1 tablet by mouth daily 84 tablet 3     No Known Allergies  No lab results found.     Breast Cancer Screening:        History of abnormal Pap smear: NO - age 21-29 PAP every 3 years recommended     Reviewed and updated as needed this visit by clinical staff                  Reviewed and updated as needed this visit by Provider                 Past Medical History:   Diagnosis Date     Anxiety 12/18/2019     Depressive disorder 2014     Dislocated elbow- at age 1.5  2/17/2012     Lipoma of neck     posterior     Mild major depression (H) 12/18/2019    paroxetine - too fatigued during the day       Past Surgical History:   Procedure Laterality Date     EXCISE LESION NECK  08/27/2012    Procedure: EXCISE LESION NECK;  Excision Lipoma Posterior Neck ;  Surgeon: Aureliano Henderson MD;  Location: [facility] OR     TONSILLECTOMY  2008     OB History   No obstetric history on file.       Review of Systems   Constitutional: Negative for chills and fever.   HENT: Negative for congestion, ear pain, hearing loss and sore throat.    Eyes: Negative for pain and visual disturbance.   Respiratory: Negative for cough and shortness of breath.    Cardiovascular: Negative for chest pain, palpitations and peripheral edema.   Gastrointestinal: Negative for abdominal pain, constipation, diarrhea, heartburn, hematochezia and nausea.   Breasts:  Negative for tenderness, breast mass and discharge.   Genitourinary: Negative for dysuria, frequency, genital sores, hematuria, pelvic pain, urgency, vaginal bleeding and vaginal discharge.   Musculoskeletal: Negative for arthralgias, joint swelling and myalgias.   Skin: Negative for rash.   Neurological: Positive for dizziness and headaches. Negative for weakness and paresthesias.  "  Psychiatric/Behavioral: Negative for mood changes. The patient is nervous/anxious.           OBJECTIVE:   /73   Pulse 77   Temp 98.4  F (36.9  C) (Tympanic)   Resp 14   Ht 1.626 m (5' 4\")   Wt 57.2 kg (126 lb 3.2 oz)   LMP 04/23/2023 (Exact Date)   SpO2 100%   BMI 21.66 kg/m    Physical Exam  GENERAL: healthy, alert and no distress  EYES: Eyes grossly normal to inspection, PERRL and conjunctivae and sclerae normal  HENT: ear canals and TM's normal, nose and mouth without ulcers or lesions  NECK: no adenopathy, no asymmetry, masses, or scars and thyroid normal to palpation  RESP: lungs clear to auscultation - no rales, rhonchi or wheezes  BREAST: normal without masses, tenderness or nipple discharge and no palpable axillary masses or adenopathy  CV: regular rate and rhythm, normal S1 S2, no S3 or S4, no murmur, click or rub, no peripheral edema and peripheral pulses strong  ABDOMEN: soft, nontender, no hepatosplenomegaly, no masses and bowel sounds normal   (female): normal female external genitalia, normal urethral meatus, vaginal mucosa pink, moist, well rugated, and normal cervix/adnexa/uterus without masses or discharge  MS: no gross musculoskeletal defects noted, no edema  SKIN: no suspicious lesions or rashes  NEURO: Normal strength and tone, mentation intact and speech normal  PSYCH: mentation appears normal, affect normal/bright  Full bladder mildly interfered with bimanual exam today.  Ovaries and contours of uterus not well palpated.        Diagnostic Test Results:  Labs reviewed in Epic    ASSESSMENT/PLAN:       ICD-10-CM    1. Encounter for routine adult medical exam with abnormal findings  Z00.01 Hemoglobin     Hemoglobin      2. Cervical cancer screening  Z12.4 Pap Screen only - recommended age 21 - 24 years      3. Need for COVID-19 vaccine  Z23 COVID-19 BIVALENT 18+ (MODERNA)      4. Screen for STD (sexually transmitted disease)  Z11.3 NEISSERIA GONORRHOEA PCR     CHLAMYDIA " TRACHOMATIS PCR      5. Anxiety- desires to switch from fluoxetine - not effective for her - will try escitalopram instead   F41.9 escitalopram (LEXAPRO) 5 MG tablet      6. Difficulty controlling anger  R45.4       7. Dysmenorrhea  N94.6 levonorgestrel-ethinyl estradiol (AVIANE) 0.1-20 MG-MCG tablet      8. Encounter for initial prescription of contraceptive pills  Z30.011 levonorgestrel-ethinyl estradiol (AVIANE) 0.1-20 MG-MCG tablet     HCG qualitative urine      9. Mild recurrent major depression (H)  F33.0              Patient has been advised of split billing requirements and indicates understanding: Yes      COUNSELING:  Reviewed preventive health counseling, as reflected in patient instructions  Pt would like to get started on ocp's as well to help her periods and  Is sexually active.    Check urine HCG today. Discussed day 5 start for first ocp pack.  See AVS.     Return in about 5 weeks (around 6/28/2023) for depression/anxiety, as a video visit, w/ Dr Sherwood.     She reports that she has never smoked. She has never used smokeless tobacco.           Jenifer Sherwood MD  [facility] [place]

## 2023-05-23 NOTE — PATIENT INSTRUCTIONS
[facility]  [address] [place] [address]  Office: [phone]   Fax:    [phone]       To prevent and/or treat mild side effects (body aches, chills, fever, fatigue) -not related to allergies - after receiving COVID-19 novel coronavirus vaccine or other vaccines :     Go into your shots very well hydrated and stay well hydrated for 3-4 days afterwards.     Don't take the Ibuprofen prior to your shot, but rather afterward.  No prescription steroids within 2 weeks of the shots as they can suppress your immune system, but Ibuprofen is not a significant immunosuppressant at the 400mg dosage.   Ok to take tylenol 2-325 or 2-500mg tabs prior to vaccine.    Take claritin (a non-sedating anti-histamine) 10 mg daily for 2 days and 2 over the counter 200mg  Ibuprofen pills every 4-6 hours with food while awake for the next 2-3 days .  Take them with food so they don't irritate your stomach.  You can do the above regimen for 2-3 days after your first,  second, or third  shots to decrease the possibility of achiness, fever, chills after your COVID-19 novel coronavirus or any current vaccines.      Wait until your next period to start your pills/ patches - first day of next menstrual period will be day #1,  Take your pill or Put on your patch on day #5, then next cycle take your pill or put your patch on day #7 and always restart active pills/patches on Day#7 of your placebo  Pills in  pack regardless of whether your period is done or no.     If you are running your pill packs together to get a period every 3 months - take only the active pills in the first 3 rows for 4 packs, then take placebo week at the end of the 4th pack to get period.  Start next active pack on the 7th day of the sugar pills- take an active pill from next pack instead of the 7th sugar pill.       .     Preventive Health Recommendations  Female Ages 21 to 25     Yearly exam:   See your health care provider every  "year in order to  Review health changes.   Discuss preventive care.    Review your medicines if your doctor has prescribed any.    You should be tested each year for STDs (sexually transmitted diseases).     Talk to your provider about how often you should have cholesterol testing.    Get a Pap test every three years. If you have an abnormal result, your doctor may have you test more often.    If you are at risk for diabetes, you should have a diabetes test (fasting glucose).     Shots:   Get a flu shot each year.   Get a tetanus shot every 10 years.   Consider getting the shot (vaccine) that prevents cervical cancer (Gardasil).    Nutrition:   Eat at least 5 servings of fruits and vegetables each day.  Eat whole-grain bread, whole-wheat pasta and brown rice instead of white grains and rice.  Get adequate Calcium and Vitamin D.     Lifestyle  Exercise at least 150 minutes a week each week (30 minutes a day, 5 days a week). This will help you control your weight and prevent disease.  Limit alcohol to one drink per day.  No smoking.   Wear sunscreen to prevent skin cancer.  See your dentist every six months for an exam and cleaning.  Thank you so much or choosing [facility]  for your Health Care. It was a pleasure seeing you at your visit today! Please contact us with any questions or concerns you may have.                   Jenifer Sherwood MD                              To reach your [facility] care team after hours call:   [phone] press #2 \"to speak with your care team\".  This will get you to our clinic instead of routing to central [facility]  scheduling.     PLEASE NOTE OUR HOURS HAVE CHANGED secondary to COVID-19 coronavirus pandemic, as we are trying to minimize patient exposure to the virus,  which is now widespread in the [facility].  These hours may change with very little notice.  We apologize for any inconvenience.       Our current " clinic hours are:          Monday- Thursday   7:00am - 6:00pm  in person.      Friday  7:00am- 5:00pm                       Saturday and Sunday : Closed to in person and virtual visits        We have telephone and virtual visit times available between    7:00am - 6pm on Monday-Friday as well.                                                Phone:  [phone]      Our pharmacy hours: Monday through Friday 8:00am to 5:00pm                        Saturday - 9:00 am to 12 noon       Sunday : Closed.              Phone:  [phone]              ###  Please note: at this time we are not accepting any walk-in visits. ###      There is also information available at our web site:  [email or web address]    If your provider ordered any lab tests and you do not receive the results within 10 business days, please call the clinic.    If you need a medication refill please contact your pharmacy.  Please allow 3 business days for your refill to be completed.    Our clinic offers telephone visits and e visits.  Please ask one of your team members to explain more.      Use [employer] (secure email communication and access to your chart) to send your primary care provider a message or make an appointment. Ask someone on your Team how to sign up for [employer].

## 2023-05-24 LAB
C TRACH DNA SPEC QL NAA+PROBE: NEGATIVE
N GONORRHOEA DNA SPEC QL NAA+PROBE: NEGATIVE

## 2023-05-26 LAB
BKR LAB AP GYN ADEQUACY: NORMAL
BKR LAB AP GYN INTERPRETATION: NORMAL
BKR LAB AP HPV REFLEX: NO
BKR LAB AP LMP: NORMAL
BKR LAB AP PREVIOUS ABNORMAL: NORMAL
PATH REPORT.COMMENTS IMP SPEC: NORMAL
PATH REPORT.COMMENTS IMP SPEC: NORMAL
PATH REPORT.RELEVANT HX SPEC: NORMAL

## 2023-06-11 NOTE — RESULT ENCOUNTER NOTE
Dear Fide     Sorry for not getting this result note with my comments to you sooner.     All your recent labs are normal, improved, or pretty stable from previous.     Please, continue your current medications and/or supplements and follow up as we discussed at your last visit.     For additional lab test information, [email or web address] is an excellent reference.    Thank you so much for choosing [facility].  Please contact us with any questions that you may have.   We appreciate the opportunity to serve you now and look forward to supporting your healthcare needs for a long time to come!    Most Sincerely,     Jenifer Sherwood MD

## 2023-06-26 ASSESSMENT — ANXIETY QUESTIONNAIRES
7. FEELING AFRAID AS IF SOMETHING AWFUL MIGHT HAPPEN: SEVERAL DAYS
8. IF YOU CHECKED OFF ANY PROBLEMS, HOW DIFFICULT HAVE THESE MADE IT FOR YOU TO DO YOUR WORK, TAKE CARE OF THINGS AT HOME, OR GET ALONG WITH OTHER PEOPLE?: SOMEWHAT DIFFICULT
IF YOU CHECKED OFF ANY PROBLEMS ON THIS QUESTIONNAIRE, HOW DIFFICULT HAVE THESE PROBLEMS MADE IT FOR YOU TO DO YOUR WORK, TAKE CARE OF THINGS AT HOME, OR GET ALONG WITH OTHER PEOPLE: SOMEWHAT DIFFICULT
7. FEELING AFRAID AS IF SOMETHING AWFUL MIGHT HAPPEN: SEVERAL DAYS
GAD7 TOTAL SCORE: 8
4. TROUBLE RELAXING: NOT AT ALL
1. FEELING NERVOUS, ANXIOUS, OR ON EDGE: SEVERAL DAYS
6. BECOMING EASILY ANNOYED OR IRRITABLE: MORE THAN HALF THE DAYS
2. NOT BEING ABLE TO STOP OR CONTROL WORRYING: MORE THAN HALF THE DAYS
3. WORRYING TOO MUCH ABOUT DIFFERENT THINGS: MORE THAN HALF THE DAYS
5. BEING SO RESTLESS THAT IT IS HARD TO SIT STILL: NOT AT ALL

## 2023-06-28 ENCOUNTER — VIRTUAL VISIT (OUTPATIENT)
Dept: FAMILY MEDICINE | Facility: CLINIC | Age: 23
End: 2023-06-28
Payer: COMMERCIAL

## 2023-06-28 DIAGNOSIS — F41.9 ANXIETY: ICD-10-CM

## 2023-06-28 DIAGNOSIS — F33.0 MILD RECURRENT MAJOR DEPRESSION (H): ICD-10-CM

## 2023-06-28 PROCEDURE — 99213 OFFICE O/P EST LOW 20 MIN: CPT | Mod: VID | Performed by: FAMILY MEDICINE

## 2023-06-28 RX ORDER — ESCITALOPRAM OXALATE 10 MG/1
10 TABLET ORAL DAILY
Qty: 90 TABLET | Refills: 0 | Status: SHIPPED | OUTPATIENT
Start: 2023-06-28 | End: 2023-11-07

## 2023-06-28 ASSESSMENT — ANXIETY QUESTIONNAIRES
GAD7 TOTAL SCORE: 9
GAD7 TOTAL SCORE: 9
3. WORRYING TOO MUCH ABOUT DIFFERENT THINGS: SEVERAL DAYS
4. TROUBLE RELAXING: MORE THAN HALF THE DAYS
5. BEING SO RESTLESS THAT IT IS HARD TO SIT STILL: NOT AT ALL
8. IF YOU CHECKED OFF ANY PROBLEMS, HOW DIFFICULT HAVE THESE MADE IT FOR YOU TO DO YOUR WORK, TAKE CARE OF THINGS AT HOME, OR GET ALONG WITH OTHER PEOPLE?: VERY DIFFICULT
7. FEELING AFRAID AS IF SOMETHING AWFUL MIGHT HAPPEN: MORE THAN HALF THE DAYS
1. FEELING NERVOUS, ANXIOUS, OR ON EDGE: MORE THAN HALF THE DAYS
GAD7 TOTAL SCORE: 9
7. FEELING AFRAID AS IF SOMETHING AWFUL MIGHT HAPPEN: MORE THAN HALF THE DAYS
6. BECOMING EASILY ANNOYED OR IRRITABLE: SEVERAL DAYS
2. NOT BEING ABLE TO STOP OR CONTROL WORRYING: SEVERAL DAYS
IF YOU CHECKED OFF ANY PROBLEMS ON THIS QUESTIONNAIRE, HOW DIFFICULT HAVE THESE PROBLEMS MADE IT FOR YOU TO DO YOUR WORK, TAKE CARE OF THINGS AT HOME, OR GET ALONG WITH OTHER PEOPLE: VERY DIFFICULT

## 2023-06-28 ASSESSMENT — PATIENT HEALTH QUESTIONNAIRE - PHQ9
SUM OF ALL RESPONSES TO PHQ QUESTIONS 1-9: 11
SUM OF ALL RESPONSES TO PHQ QUESTIONS 1-9: 11
10. IF YOU CHECKED OFF ANY PROBLEMS, HOW DIFFICULT HAVE THESE PROBLEMS MADE IT FOR YOU TO DO YOUR WORK, TAKE CARE OF THINGS AT HOME, OR GET ALONG WITH OTHER PEOPLE: VERY DIFFICULT

## 2023-06-28 NOTE — PROGRESS NOTES
"Fide is a 23 year old who is being evaluated via a billable video visit.      How would you like to obtain your AVS? MyChart  If the video visit is dropped, the invitation should be resent by: Text to cell phone: [phone]  Will anyone else be joining your video visit? No        Assessment & Plan :       ICD-10-CM    1. Anxiety- desires to switch from fluoxetine - not effective for her - will try escitalopram instead   F41.9 escitalopram (LEXAPRO) 10 MG tablet      2. Mild recurrent major depression (H)  F33.0 escitalopram (LEXAPRO) 10 MG tablet        Increase escitalopram from 5mg to 10mg daily.     Return in 5 weeks (on 8/4/2023) for depression, anxiety, as a video visit, w/ Dr Sherwood.     Ordering of each unique test  Prescription drug management  18 minutes spent by me on the date of the encounter doing chart review, history and exam, documentation and further activities per the note       MEDICATIONS:   Orders Placed This Encounter   Medications     escitalopram (LEXAPRO) 10 MG tablet     Sig: Take 1 tablet (10 mg) by mouth daily     Dispense:  90 tablet     Refill:  0     Please note dosage change from previous 5mg tabs          - Continue other medications without change  Work on weight loss  Regular exercise  See Patient Instructions           Jenifer Sherwood MD  [facility] [place]      Subjective :   Fide is a 23 year old, presenting for the following health issues:  Recheck Medication  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   Likes it a lot - doing different rotation - housekeeping, marketing, . Mood -wise she's been feeling really good.  Feels really content with where she is right now.  \"going with the flow.\"      Getting back to a routine definitely helped.  She likes to have a work schedule.    She's feeling really good on the 5mg of lexapro. No side effects. Feels like she needs to \"get out and do more " "stuff\"  After work especially.  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  She'll handle that.          5/23/2023     9:45 AM   Additional Questions   Roomed by Lynette Addison   Accompanied by Self     History of Present Illness       Mental Health Follow-up:  Patient presents to follow-up on Depression & Anxiety.Patient's depression since last visit has been:  Medium  The patient is having other symptoms associated with depression.  Patient's anxiety since last visit has been:  Good  The patient is not having other symptoms associated with anxiety.  Any significant life events: No  Patient is feeling anxious or having panic attacks.  Patient has no concerns about alcohol or drug use.    She eats 0-1 servings of fruits and vegetables daily.She consumes 2 sweetened beverage(s) daily.She exercises with enough effort to increase her heart rate 60 or more minutes per day.  She exercises with enough effort to increase her heart rate 5 days per week. She is missing 1 dose(s) of medications per week.    Today's PHQ-9         PHQ-9 Total Score: 11    PHQ-9 Q9 Thoughts of better off dead/self-harm past 2 weeks :   Not at all    How difficult have these problems made it for you to do your work, take care of things at home, or get along with other people: Very difficult           6/28/2023     5:16 PM   Last PHQ-9   1.  Little interest or pleasure in doing things 2   2.  Feeling down, depressed, or hopeless 2   3.  Trouble falling or staying asleep, or sleeping too much 1   4.  Feeling tired or having little energy 1   5.  Poor appetite or overeating 2   6.  Feeling bad about yourself 2   7.  Trouble concentrating 1   8.  Moving slowly or restless 0   Q9: Thoughts of better off dead/self-harm past 2 weeks 0   PHQ-9 Total Score 11         6/28/2023     5:16 PM   BEN-7    1. Feeling nervous, anxious, or on edge 2   2. Not being able " to stop or control worrying 1   3. Worrying too much about different things 1   4. Trouble relaxing 2   5. Being so restless that it is hard to sit still 0   6. Becoming easily annoyed or irritable 1   7. Feeling afraid, as if something awful might happen 2   BEN-7 Total Score 9   If you checked any problems, how difficult have they made it for you to do your work, take care of things at home, or get along with other people? 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         Review of Systems   Constitutional, HEENT, cardiovascular, pulmonary, GI, , musculoskeletal, neuro, skin, endocrine and psych systems are negative, except as otherwise noted.      Objective           Vitals:  No vitals were obtained today due to virtual visit.    Physical Exam   GENERAL: Healthy, alert and no distress  EYES: Eyes grossly normal to inspection.  No discharge or erythema, or obvious scleral/conjunctival abnormalities.  RESP: No audible wheeze, cough, or visible cyanosis.  No visible retractions or increased work of breathing.    SKIN: Visible skin clear. No significant rash, abnormal pigmentation or lesions.  NEURO: Cranial nerves grossly intact.  Mentation and speech appropriate for age.  PSYCH: Mentation appears normal, affect normal/bright, judgement and insight intact, normal  speech and appearance well-groomed.    Office Visit on 05/23/2023   Component Date Value Ref Range Status     Interpretation 05/23/2023 Negative for Intraepithelial Lesion or Malignancy (NILM)    Final     Comment 05/23/2023    Final                    Value:This result contains rich text formatting which cannot be displayed here.     Specimen Adequacy 05/23/2023 Satisfactory for evaluation, endocervical/transformation zone component present   Final     Clinical Information 05/23/2023    Final                    Value:This result contains rich text formatting which cannot be displayed here.     LMP/Menopause Date 05/23/2023    Final                    Value:This result contains rich text formatting which cannot be displayed here.     Reflex Testing 05/23/2023 No   Final     Previous Abnormal? 05/23/2023    Final                    Value:This result contains rich text formatting which cannot be displayed here.     Performing Labs 05/23/2023    Final                    Value:This result contains rich text formatting which cannot be displayed here.     Neisseria gonorrhoeae 05/23/2023 Negative  Negative Final    Negative for N. gonorrhoeae rRNA by transcription mediated amplification. A negative result by transcription mediated amplification does not preclude the presence of C. trachomatis infection because results are dependent on proper and adequate collection, absence of inhibitors and sufficient rRNA to be detected.     Chlamydia trachomatis 05/23/2023 Negative  Negative Final    A negative result by transcription mediated amplification does not preclude the presence of C. trachomatis infection because results are dependent on proper and adequate collection, absence of inhibitors and sufficient rRNA to be detected.     hCG Urine Qualitative 05/23/2023 Negative  Negative Final    This test is for screening purposes.  Results should be interpreted along with the clinical picture.  Confirmation testing is available if  warranted by ordering [id number], HCG Quantitative Pregnancy.     Hemoglobin 05/23/2023 13.3  11.7 - 15.7 g/dL Final               Video-Visit Details    Type of service:  Video Visit   Video Start Time: 6:15 PM  Video End Time:6:31 PM  Total time = 15 minutes.     Originating Location (pt. Location): Home  Distant Location (provider location):  On-site  Platform used for Video Visit: MarieWell

## 2023-08-03 ASSESSMENT — ANXIETY QUESTIONNAIRES
5. BEING SO RESTLESS THAT IT IS HARD TO SIT STILL: NOT AT ALL
6. BECOMING EASILY ANNOYED OR IRRITABLE: SEVERAL DAYS
3. WORRYING TOO MUCH ABOUT DIFFERENT THINGS: SEVERAL DAYS
IF YOU CHECKED OFF ANY PROBLEMS ON THIS QUESTIONNAIRE, HOW DIFFICULT HAVE THESE PROBLEMS MADE IT FOR YOU TO DO YOUR WORK, TAKE CARE OF THINGS AT HOME, OR GET ALONG WITH OTHER PEOPLE: SOMEWHAT DIFFICULT
2. NOT BEING ABLE TO STOP OR CONTROL WORRYING: SEVERAL DAYS
7. FEELING AFRAID AS IF SOMETHING AWFUL MIGHT HAPPEN: SEVERAL DAYS
4. TROUBLE RELAXING: NOT AT ALL
1. FEELING NERVOUS, ANXIOUS, OR ON EDGE: SEVERAL DAYS
GAD7 TOTAL SCORE: 5

## 2023-08-03 ASSESSMENT — PATIENT HEALTH QUESTIONNAIRE - PHQ9: SUM OF ALL RESPONSES TO PHQ QUESTIONS 1-9: 9

## 2023-08-04 ASSESSMENT — PATIENT HEALTH QUESTIONNAIRE - PHQ9
SUM OF ALL RESPONSES TO PHQ QUESTIONS 1-9: 9
10. IF YOU CHECKED OFF ANY PROBLEMS, HOW DIFFICULT HAVE THESE PROBLEMS MADE IT FOR YOU TO DO YOUR WORK, TAKE CARE OF THINGS AT HOME, OR GET ALONG WITH OTHER PEOPLE: VERY DIFFICULT

## 2023-08-04 ASSESSMENT — ANXIETY QUESTIONNAIRES: GAD7 TOTAL SCORE: 5

## 2023-08-10 ENCOUNTER — VIRTUAL VISIT (OUTPATIENT)
Dept: FAMILY MEDICINE | Facility: CLINIC | Age: 23
End: 2023-08-10
Payer: COMMERCIAL

## 2023-08-10 DIAGNOSIS — N94.6 DYSMENORRHEA: ICD-10-CM

## 2023-08-10 DIAGNOSIS — F41.9 ANXIETY: Primary | ICD-10-CM

## 2023-08-10 DIAGNOSIS — R45.4 DIFFICULTY CONTROLLING ANGER: ICD-10-CM

## 2023-08-10 DIAGNOSIS — Z30.011 ENCOUNTER FOR INITIAL PRESCRIPTION OF CONTRACEPTIVE PILLS: ICD-10-CM

## 2023-08-10 DIAGNOSIS — F33.0 MILD RECURRENT MAJOR DEPRESSION (H): ICD-10-CM

## 2023-08-10 PROCEDURE — 99213 OFFICE O/P EST LOW 20 MIN: CPT | Mod: VID | Performed by: FAMILY MEDICINE

## 2023-08-10 RX ORDER — LEVONORGESTREL/ETHIN.ESTRADIOL 0.1-0.02MG
1 TABLET ORAL DAILY
Qty: 84 TABLET | Refills: 3 | Status: SHIPPED | OUTPATIENT
Start: 2023-08-10 | End: 2024-05-03

## 2023-08-10 ASSESSMENT — ANXIETY QUESTIONNAIRES
6. BECOMING EASILY ANNOYED OR IRRITABLE: SEVERAL DAYS
5. BEING SO RESTLESS THAT IT IS HARD TO SIT STILL: NOT AT ALL
GAD7 TOTAL SCORE: 5
1. FEELING NERVOUS, ANXIOUS, OR ON EDGE: SEVERAL DAYS
3. WORRYING TOO MUCH ABOUT DIFFERENT THINGS: SEVERAL DAYS
2. NOT BEING ABLE TO STOP OR CONTROL WORRYING: SEVERAL DAYS
7. FEELING AFRAID AS IF SOMETHING AWFUL MIGHT HAPPEN: SEVERAL DAYS
GAD7 TOTAL SCORE: 5
IF YOU CHECKED OFF ANY PROBLEMS ON THIS QUESTIONNAIRE, HOW DIFFICULT HAVE THESE PROBLEMS MADE IT FOR YOU TO DO YOUR WORK, TAKE CARE OF THINGS AT HOME, OR GET ALONG WITH OTHER PEOPLE: SOMEWHAT DIFFICULT

## 2023-08-10 ASSESSMENT — PATIENT HEALTH QUESTIONNAIRE - PHQ9: 5. POOR APPETITE OR OVEREATING: NOT AT ALL

## 2023-08-10 NOTE — PATIENT INSTRUCTIONS
" [facility]  [address] [place] [address]  Office: [phone]   Fax:    [phone]       Please,  continue your current medications and/or supplements and follow up as we discussed today.       Pt will call now to schedule appt at her school's student counseling center for after school starts again.     Return in about 2 months (around 10/10/2023) for depression, anxiety, as a video visit, w/ Dr Sherwood.  Future Appointments 8/10/2023 - 2/6/2024        Date Visit Type Length Department Provider     10/10/2023 11:20 AM OFFICE VISIT 40 min [facility] Jenifer Sherwood MD    Location Instructions:     [facility] is located at [address] [place] [address], along [address]. Free parking is available; access the lot by turning north from [address] onto [address], then west onto [address].                           Thank you so much for doing a video visit with us today. And, again, thank you  for choosing our [facility].  Please contact us with any questions that you may have.   We appreciate the opportunity to serve you now and look forward to supporting your healthcare needs for a long time to come!    Our clinic for the foreseeable future and until further notice , will continue to offer virtual visits, including telephone visits, video visits,  and e-visits, in addition to in person visits,  especially during this period of COVID-19 coronavirus pandemic.  Please call to schedule another one if you need it!        Most Sincerely,     Jenifer Sherwood MD                                              To reach your [facility] care team after hours call:   [phone] press #2 \"to speak with your care team\".  This will get you to our clinic instead of routing to central [facility]  scheduling.     PLEASE NOTE OUR HOURS HAVE CHANGED secondary to COVID-19 " coronavirus pandemic, as we are trying to minimize patient exposure to the virus,  which is now widespread in the state.  These hours may change with very little notice.  We apologize for any inconvenience.       Our current clinic hours are:         Monday- Thursday   7:00am - 6:00pm  in person.      Friday  7:00am- 5:00pm in person                       Saturday and Sunday : Closed to in person and virtual visits            We have telephone and virtual visit times available between 7:00am - 6pm on             Monday-Friday as well.                                                Phone:  [phone]    Our pharmacy hours: Monday  through Friday 8:00am to 5:00pm                        Saturday - 9:00 am to 12 noon         Sunday : Closed.     ###  Please note: at this time we are not accepting any walk-in visits. ###      There is also information available at our web site:  [email or web address]    If your provider ordered any lab tests and you do not receive the results within 10 business days, please call the clinic.    If you need a medication refill please contact your pharmacy.  Please allow 3 business days for your refill to be completed.    Our clinic offers telephone visits and e visits.  Please ask one of your team members to explain more.      Use Farahart (secure email communication and access to your chart) to send your primary care provider a message or make an appointment. Ask someone on your Team how to sign up for [employer].

## 2023-08-10 NOTE — PROGRESS NOTES
Fide is a 23 year old who is being evaluated via a billable video visit.      How would you like to obtain your AVS? MyChart  If the video visit is dropped, the invitation should be resent by: Text to cell phone: [phone]  Will anyone else be joining your video visit? No          Assessment & Plan :       ICD-10-CM    1. Anxiety  F41.9       2. Mild recurrent major depression (H)  F33.0       3. Dysmenorrhea  N94.6 levonorgestrel-ethinyl estradiol (AVIANE) 0.1-20 MG-MCG tablet      4. Encounter for initial prescription of contraceptive pills- needs refills transferred to pharmacy near her university in [place]  Z30.011 levonorgestrel-ethinyl estradiol (AVIANE) 0.1-20 MG-MCG tablet      5. Difficulty controlling anger  R45.4           Please,  continue your current medications and/or supplements and follow up as we discussed today.     Pt will call now to schedule appt at her school's student counseling center for after school starts again.     Return in about 2 months (around 10/10/2023) for depression, anxiety, as a video visit, w/ Dr Sherwood.     Future Appointments 8/10/2023 - 2/6/2024        Date Visit Type Length Department Provider     10/10/2023 11:20 AM OFFICE VISIT 40 min [facility] Jenifer Sherwood MD    Location Instructions:     [facility] is located at [address] [place] [address], along [address]. Free parking is available; access the lot by turning north from [address] onto [address], then west onto [address].                    Prescription drug management  17 minutes spent by me on the date of the encounter doing chart review, history and exam, documentation and further activities per the note       MEDICATIONS:  Continue current medications without change  Regular exercise  See Patient Instructions           Jenifer Sherwood MD  [facility]    Subjective :   Fide is a 23 year old, presenting for the following  health issues:  1. Anxiety    2. Mild recurrent major depression (H)    3. Dysmenorrhea    4. Encounter for initial prescription of contraceptive pills- needs refills transferred to pharmacy near her [facility] in [place]    5. 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       History of Present Illness       Reason for visit:  Follow up    She eats 0-1 servings of fruits and vegetables daily.She consumes 2 sweetened beverage(s) daily.She exercises with enough effort to increase her heart rate 20 to 29 minutes per day.  She exercises with enough effort to increase her heart rate 5 days per week.   She is taking medications regularly.       Depression and Anxiety Follow-Up: hasn't scheduled counseling appt yet as she's been really busy with her internship.  She's going to do 5 free session at school when school starts on 8/21/2023.  She'll try to call prior to that to schedule.     Feels her frustrations and anger are easier to control than previous as well.     How are you doing with your depression since your last visit? No change  How are you doing with your anxiety since your last visit?  No change  Are you having other symptoms that might be associated with depression or anxiety? No  Have you had a significant life event? No   Do you have any concerns with your use of alcohol or other drugs? No  Feeling really good re: dosage and no side effects.   She'd like to stay at current     Started on ocp's - Aviane 0.1/20 daily.  Periods about same after not getting her period for a month.   Feels like that is working well for her over all.      She's been doing exercise - walking daily.     Social History     Tobacco Use    Smoking status: Never    Smokeless tobacco: Never    Tobacco comments:     no one smokes in home   Vaping Use    Vaping Use: Never used   Substance Use Topics     Alcohol use: Yes    Drug use: No         6/28/2023     5:16 PM 8/3/2023     7:31 PM 8/10/2023     5:58 PM   PHQ   PHQ-9 Total Score 11 9    9 8   Q9: Thoughts of better off dead/self-harm past 2 weeks Not at all Not at all    Not at all Not at all         6/28/2023     5:16 PM 8/3/2023     7:32 PM 8/10/2023     5:58 PM   BEN-7 SCORE   Total Score 9 (mild anxiety) 5 (mild anxiety)    Total Score 9 5    5 5         8/10/2023     5:58 PM   Last PHQ-9   1.  Little interest or pleasure in doing things 1   2.  Feeling down, depressed, or hopeless 1   3.  Trouble falling or staying asleep, or sleeping too much 1   4.  Feeling tired or having little energy 2   5.  Poor appetite or overeating 0   6.  Feeling bad about yourself 1   7.  Trouble concentrating 2   8.  Moving slowly or restless 0   Q9: Thoughts of better off dead/self-harm past 2 weeks 0   PHQ-9 Total Score 8   Difficulty at work, home, or with people Somewhat difficult         8/10/2023     5:58 PM   BEN-7    1. Feeling nervous, anxious, or on edge 1   2. Not being able to stop or control worrying 1   3. Worrying too much about different things 1   4. Trouble relaxing 0   5. Being so restless that it is hard to sit still 0   6. Becoming easily annoyed or irritable 1   7. Feeling afraid, as if something awful might happen 1   BEN-7 Total Score 5   If you checked any problems, how difficult have they made it for you to do your work, take care of things at home, or get along with other people? Somewhat difficult     Patient Active Problem List   Diagnosis    Anxiety    Mild recurrent major depression (H)    Patellar dislocation, left, initial encounter- first visit to clinic = 12/2021 , had recurrent of same as a child     Difficulty controlling anger       Current Outpatient Medications   Medication Sig Dispense Refill    escitalopram (LEXAPRO) 10 MG tablet Take 1 tablet (10 mg) by mouth daily 90 tablet 0    levonorgestrel-ethinyl estradiol (AVIANE) 0.1-20  MG-MCG tablet Take 1 tablet by mouth daily 84 tablet 3        No Known Allergies      Review of Systems   Constitutional, HEENT, cardiovascular, pulmonary, GI, , musculoskeletal, neuro, skin, endocrine and psych systems are negative, except as otherwise noted.      Objective           Vitals:  No vitals were obtained today due to virtual visit.    Physical Exam   GENERAL: Healthy, alert and no distress  EYES: Eyes grossly normal to inspection.  No discharge or erythema, or obvious scleral/conjunctival abnormalities.  RESP: No audible wheeze, cough, or visible cyanosis.  No visible retractions or increased work of breathing.    SKIN: Visible skin clear. No significant rash, abnormal pigmentation or lesions.  NEURO: Cranial nerves grossly intact.  Mentation and speech appropriate for age.  PSYCH: Mentation appears normal, affect normal/bright, judgement and insight intact, normal speech and appearance well-groomed.    Office Visit on 05/23/2023   Component Date Value Ref Range Status    Interpretation 05/23/2023 Negative for Intraepithelial Lesion or Malignancy (NILM)    Final    Comment 05/23/2023    Final                    Value:This result contains rich text formatting which cannot be displayed here.    Specimen Adequacy 05/23/2023 Satisfactory for evaluation, endocervical/transformation zone component present   Final    Clinical Information 05/23/2023    Final                    Value:This result contains rich text formatting which cannot be displayed here.    LMP/Menopause Date 05/23/2023    Final                    Value:This result contains rich text formatting which cannot be displayed here.    Reflex Testing 05/23/2023 No   Final    Previous Abnormal? 05/23/2023    Final                    Value:This result contains rich text formatting which cannot be displayed here.    Performing Labs 05/23/2023    Final                    Value:This result contains rich text formatting which cannot be displayed here.     Neisseria gonorrhoeae 05/23/2023 Negative  Negative Final    Negative for N. gonorrhoeae rRNA by transcription mediated amplification. A negative result by transcription mediated amplification does not preclude the presence of C. trachomatis infection because results are dependent on proper and adequate collection, absence of inhibitors and sufficient rRNA to be detected.    Chlamydia trachomatis 05/23/2023 Negative  Negative Final    A negative result by transcription mediated amplification does not preclude the presence of C. trachomatis infection because results are dependent on proper and adequate collection, absence of inhibitors and sufficient rRNA to be detected.    hCG Urine Qualitative 05/23/2023 Negative  Negative Final    This test is for screening purposes.  Results should be interpreted along with the clinical picture.  Confirmation testing is available if warranted by ordering [id number], HCG Quantitative Pregnancy.    Hemoglobin 05/23/2023 13.3  11.7 - 15.7 g/dL Final           Video-Visit Details    Type of service:  Video Visit   Video Start Time: 5:39 PM  Video End Time:5:50 PM  Total time on video: 11 minutes    Originating Location (pt. Location): Home    Distant Location (provider location):  On-site  Platform used for Video Visit: Wan

## 2023-10-23 ASSESSMENT — ANXIETY QUESTIONNAIRES
6. BECOMING EASILY ANNOYED OR IRRITABLE: MORE THAN HALF THE DAYS
5. BEING SO RESTLESS THAT IT IS HARD TO SIT STILL: NOT AT ALL
7. FEELING AFRAID AS IF SOMETHING AWFUL MIGHT HAPPEN: SEVERAL DAYS
IF YOU CHECKED OFF ANY PROBLEMS ON THIS QUESTIONNAIRE, HOW DIFFICULT HAVE THESE PROBLEMS MADE IT FOR YOU TO DO YOUR WORK, TAKE CARE OF THINGS AT HOME, OR GET ALONG WITH OTHER PEOPLE: VERY DIFFICULT
3. WORRYING TOO MUCH ABOUT DIFFERENT THINGS: MORE THAN HALF THE DAYS
GAD7 TOTAL SCORE: 8
4. TROUBLE RELAXING: SEVERAL DAYS
2. NOT BEING ABLE TO STOP OR CONTROL WORRYING: SEVERAL DAYS
1. FEELING NERVOUS, ANXIOUS, OR ON EDGE: SEVERAL DAYS

## 2023-10-23 ASSESSMENT — PATIENT HEALTH QUESTIONNAIRE - PHQ9: SUM OF ALL RESPONSES TO PHQ QUESTIONS 1-9: 10

## 2023-10-24 ASSESSMENT — PATIENT HEALTH QUESTIONNAIRE - PHQ9
10. IF YOU CHECKED OFF ANY PROBLEMS, HOW DIFFICULT HAVE THESE PROBLEMS MADE IT FOR YOU TO DO YOUR WORK, TAKE CARE OF THINGS AT HOME, OR GET ALONG WITH OTHER PEOPLE: VERY DIFFICULT
SUM OF ALL RESPONSES TO PHQ QUESTIONS 1-9: 10

## 2023-11-06 ASSESSMENT — ANXIETY QUESTIONNAIRES
3. WORRYING TOO MUCH ABOUT DIFFERENT THINGS: MORE THAN HALF THE DAYS
4. TROUBLE RELAXING: SEVERAL DAYS
IF YOU CHECKED OFF ANY PROBLEMS ON THIS QUESTIONNAIRE, HOW DIFFICULT HAVE THESE PROBLEMS MADE IT FOR YOU TO DO YOUR WORK, TAKE CARE OF THINGS AT HOME, OR GET ALONG WITH OTHER PEOPLE: VERY DIFFICULT
GAD7 TOTAL SCORE: 10
GAD7 TOTAL SCORE: 10
1. FEELING NERVOUS, ANXIOUS, OR ON EDGE: SEVERAL DAYS
7. FEELING AFRAID AS IF SOMETHING AWFUL MIGHT HAPPEN: SEVERAL DAYS
5. BEING SO RESTLESS THAT IT IS HARD TO SIT STILL: SEVERAL DAYS
2. NOT BEING ABLE TO STOP OR CONTROL WORRYING: MORE THAN HALF THE DAYS
6. BECOMING EASILY ANNOYED OR IRRITABLE: MORE THAN HALF THE DAYS

## 2023-11-07 ENCOUNTER — VIRTUAL VISIT (OUTPATIENT)
Dept: FAMILY MEDICINE | Facility: CLINIC | Age: 23
End: 2023-11-07
Payer: COMMERCIAL

## 2023-11-07 ENCOUNTER — TELEPHONE (OUTPATIENT)
Dept: FAMILY MEDICINE | Facility: CLINIC | Age: 23
End: 2023-11-07

## 2023-11-07 DIAGNOSIS — R45.4 DIFFICULTY CONTROLLING ANGER: ICD-10-CM

## 2023-11-07 DIAGNOSIS — F41.9 ANXIETY: ICD-10-CM

## 2023-11-07 DIAGNOSIS — F33.0 MILD RECURRENT MAJOR DEPRESSION (H): ICD-10-CM

## 2023-11-07 DIAGNOSIS — F41.9 ANXIETY: Primary | ICD-10-CM

## 2023-11-07 PROCEDURE — 99213 OFFICE O/P EST LOW 20 MIN: CPT | Mod: VID | Performed by: FAMILY MEDICINE

## 2023-11-07 RX ORDER — ESCITALOPRAM OXALATE 10 MG/1
20 TABLET ORAL DAILY
Qty: 180 TABLET | Refills: 1 | Status: SHIPPED | OUTPATIENT
Start: 2023-11-07 | End: 2023-11-14 | Stop reason: DRUGHIGH

## 2023-11-07 NOTE — PROGRESS NOTES
Fide is a 23 year old who is being evaluated via a billable video visit.      How would you like to obtain your AVS? MyChart  If the video visit is dropped, the invitation should be resent by: Text to cell phone: [phone]  Will anyone else be joining your video visit? No          Assessment & Plan :        ICD-10-CM    1. Anxiety- desires to switch from fluoxetine - not effective for her - will try escitalopram instead   F41.9 escitalopram (LEXAPRO) 10 MG tablet      2. Mild recurrent major depression (H24)  F33.0 escitalopram (LEXAPRO) 10 MG tablet        has been on escitalopram 10mg for several months = since 6/28/2023.  Discussed increasing to 15mg daily first then 20mg daily.  New rx sent to pharmacy in [place].     Encouraged regular exercise outside of work to help with stress relief as well.      Return in about 5 weeks (around 12/12/2023) for anxiety, depression, as a video visit, w/ Dr Sherwood.     Future Appointments 11/7/2023 - 5/5/2024        Date Visit Type Length Department Provider     12/12/2023  9:40 AM OFFICE VISIT 20 min [facility] Jenifer Sherwood MD    Location Instructions:     [facility] is located at [address] [place] [address], along [address] Free parking is available; access the lot by turning north from [address] onto [address], then west onto [facility] [address] [facility] [address] [address] [facility].                    Please, call our clinic or go to the ER immediately if signs or symptoms worsen or fail to improve as anticipated.     Ordering of each unique test  Prescription drug management  15 minutes spent by me on the date of the encounter doing chart review, history and exam, documentation and further activities per the note       MEDICATIONS:   Orders Placed This Encounter   Medications    escitalopram (LEXAPRO) 10 MG tablet     Sig: Take 2 tablets (20 mg) by mouth daily     Dispense:  180 tablet     Refill:  1          - Continue other medications without  change  Regular exercise  See Patient Instructions    Jenifer Sherwood MD  [facility] [place]    Subjective :   Fide is a 23 year old, presenting for the following health issues:  Recheck Medication        10/24/2023    10:53 AM   Additional Questions   Roomed by Hardeep IRVIN   Accompanied by Self       History of Present Illness       Mental Health Follow-up:  Patient presents to follow-up on Depression & Anxiety.  The patient is not having other symptoms associated with depression.    The patient is not having other symptoms associated with anxiety.    Patient is feeling anxious or having panic attacks.  Patient has no concerns about alcohol or drug use.    She eats 0-1 servings of fruits and vegetables daily. She exercises with enough effort to increase her heart rate 5 days per week.   She is not taking prescribed medications regularly due to remembering to take.     She and boyfriend broke up on Sunday 11/5/2023 - ended on a good note. Feels safe. Had been together for almost 3 years.  Discussed grieving for previous and end of relationship.  Has been able to get up and go to classes at her school.  Finding some happy distractions for herself.    Eating, showering and doing good self cares.  Found a good therapist- will make appt with them - calling today. Waiting on parents and payment situation.      Feels  a bit numb to her emotions - has been on escitalopram 10mg for several months = since 6/28/2023.  Discussed increasing to 15mg daily first then 20mg daily.  She's open to that.             Social History     Tobacco Use    Smoking status: Never    Smokeless tobacco: Never    Tobacco comments:     no one smokes in home   Vaping Use    Vaping Use: Never used   Substance Use Topics    Alcohol use: Yes    Drug use: No         8/3/2023     7:31 PM 8/10/2023     5:58 PM 10/23/2023    11:42 AM   PHQ   PHQ-9 Total Score 9    9 8 10    10   Q9: Thoughts of better off dead/self-harm past 2  weeks Not at all    Not at all Not at all Not at all    Not at all         8/10/2023     5:58 PM 10/23/2023    11:43 AM 11/6/2023     2:57 PM   BEN-7 SCORE   Total Score  8 (mild anxiety) 10 (moderate anxiety)   Total Score 5 8    8 10         10/23/2023    11:42 AM   Last PHQ-9   1.  Little interest or pleasure in doing things 2    2   2.  Feeling down, depressed, or hopeless 1    1   3.  Trouble falling or staying asleep, or sleeping too much 2    2   4.  Feeling tired or having little energy 2    2   5.  Poor appetite or overeating 1    1   6.  Feeling bad about yourself 1    1   7.  Trouble concentrating 1    1   8.  Moving slowly or restless 0    0   Q9: Thoughts of better off dead/self-harm past 2 weeks 0    0   PHQ-9 Total Score 10    10         11/6/2023     2:57 PM   BEN-7    1. Feeling nervous, anxious, or on edge 1   2. Not being able to stop or control worrying 2   3. Worrying too much about different things 2   4. Trouble relaxing 1   5. Being so restless that it is hard to sit still 1   6. Becoming easily annoyed or irritable 2   7. Feeling afraid, as if something awful might happen 1   BEN-7 Total Score 10   If you checked any problems, how difficult have they made it for you to do your work, take care of things at home, or get along with other people? Very difficult       Suicide Assessment Five-step Evaluation and Treatment (SAFE-T)    Patient Active Problem List   Diagnosis    Anxiety    Mild recurrent major depression (H24)    Patellar dislocation, left, initial encounter- first visit to clinic = 12/2021 , had recurrent of same as a child     Difficulty controlling anger     Including med changes made today:   Current Outpatient Medications   Medication Sig Dispense Refill    escitalopram (LEXAPRO) 10 MG tablet Take 2 tablets (20 mg) by mouth daily 180 tablet 1    levonorgestrel-ethinyl estradiol (AVIANE) 0.1-20 MG-MCG tablet Take 1 tablet by mouth daily 84 tablet 3        No Known Allergies            Review of Systems :   Constitutional, HEENT, cardiovascular, pulmonary, GI, , musculoskeletal, neuro, skin, endocrine and psych systems are negative, except as otherwise noted.      Objective  :          Vitals:  No vitals were obtained today due to virtual visit.    Physical Exam   GENERAL: Healthy, alert and no distress  EYES: Eyes grossly normal to inspection.  No discharge or erythema, or obvious scleral/conjunctival abnormalities.  RESP: No audible wheeze, cough, or visible cyanosis.  No visible retractions or increased work of breathing.    SKIN: Visible skin clear. No significant rash, abnormal pigmentation or lesions.  NEURO: Cranial nerves grossly intact.  Mentation and speech appropriate for age.  PSYCH: Mentation appears normal, affect normal/bright, judgement and insight intact, normal speech and appearance well-groomed.    Office Visit on 05/23/2023   Component Date Value Ref Range Status    Interpretation 05/23/2023 Negative for Intraepithelial Lesion or Malignancy (NILM)    Final    Comment 05/23/2023    Final                    Value:This result contains rich text formatting which cannot be displayed here.    Specimen Adequacy 05/23/2023 Satisfactory for evaluation, endocervical/transformation zone component present   Final    Clinical Information 05/23/2023    Final                    Value:This result contains rich text formatting which cannot be displayed here.    LMP/Menopause Date 05/23/2023    Final                    Value:This result contains rich text formatting which cannot be displayed here.    Reflex Testing 05/23/2023 No   Final    Previous Abnormal? 05/23/2023    Final                    Value:This result contains rich text formatting which cannot be displayed here.    Performing Labs 05/23/2023    Final                    Value:This result contains rich text formatting which cannot be displayed here.    Neisseria gonorrhoeae 05/23/2023 Negative  Negative Final    Negative for  N. gonorrhoeae rRNA by transcription mediated amplification. A negative result by transcription mediated amplification does not preclude the presence of C. trachomatis infection because results are dependent on proper and adequate collection, absence of inhibitors and sufficient rRNA to be detected.    Chlamydia trachomatis 05/23/2023 Negative  Negative Final    A negative result by transcription mediated amplification does not preclude the presence of C. trachomatis infection because results are dependent on proper and adequate collection, absence of inhibitors and sufficient rRNA to be detected.    hCG Urine Qualitative 05/23/2023 Negative  Negative Final    This test is for screening purposes.  Results should be interpreted along with the clinical picture.  Confirmation testing is available if warranted by ordering MCY370, HCG Quantitative Pregnancy.    Hemoglobin 05/23/2023 13.3  11.7 - 15.7 g/dL Final               Video-Visit Details    Type of service:  Video Visit   Video Start Time: 10:03 AM  Video End Time:10:15AM    Originating Location (pt. Location): Home    Distant Location (provider location):  On-site  Platform used for Video Visit: Wan

## 2023-11-07 NOTE — PATIENT INSTRUCTIONS
" [facility]  [address] [place] [address]  Office: [phone]   Fax:    [phone]     has been on escitalopram 10mg for several months = since 6/28/2023.  Discussed increasing to 15mg daily first then 20mg daily.  New rx sent to pharmacy in [place].     Encouraged regular exercise outside of work to help with stress relief as well.              Thank you so much for doing a video visit with us today. And, again, thank you  for choosing our [facility].  Please contact us with any questions that you may have.   We appreciate the opportunity to serve you now and look forward to supporting your healthcare needs for a long time to come!    Our clinic for the foreseeable future and until further notice , will continue to offer virtual visits, including telephone visits, video visits,  and e-visits, in addition to in person visits,  especially during this period of COVID-19 coronavirus pandemic.  Please call to schedule another one if you need it!        Most Sincerely,     Jenifer Sherwood MD                                              To reach your [facility] care team after hours call:   [phone] press #2 \"to speak with your care team\".  This will get you to our clinic instead of routing to central [facility]  scheduling.     PLEASE NOTE OUR HOURS HAVE CHANGED secondary to COVID-19 coronavirus pandemic, as we are trying to minimize patient exposure to the virus,  which is now widespread in the [facility].  These hours may change with very little notice.  We apologize for any inconvenience.       Our current clinic hours are:         Monday- Thursday   7:00am - 6:00pm  in person.      Friday  7:00am- 5:00pm in person                       Saturday and Sunday : Closed to in person and virtual visits            We have telephone and virtual visit times available between 7:00am - 6pm on             Monday-Friday " as well.                                                Phone:  [phone]    Our pharmacy hours: Monday  through Friday 8:00am to 5:00pm                        Saturday - 9:00 am to 12 noon         Sunday : Closed.     ###  Please note: at this time we are not accepting any walk-in visits. ###      There is also information available at our web site:  [email or web address]    If your provider ordered any lab tests and you do not receive the results within 10 business days, please call the clinic.    If you need a medication refill please contact your pharmacy.  Please allow 3 business days for your refill to be completed.    Our clinic offers telephone visits and e visits.  Please ask one of your team members to explain more.      Use MyChart (secure email communication and access to your chart) to send your primary care provider a message or make an appointment. Ask someone on your Team how to sign up for [employer].

## 2023-11-07 NOTE — TELEPHONE ENCOUNTER
Prior Authorization Retail Medication Request    Medication/Dose: Escitalopram 10MG   ICD code (if different than what is on RX):    Previously Tried and Failed:    Rationale:      Insurance Name:  [facility] [employer] [place]  Insurance ID:  [id number]       Pharmacy Information (if different than what is on RX)  Name:  [facility]   Phone:  [phone]

## 2023-11-09 NOTE — TELEPHONE ENCOUNTER
Central Prior Authorization Team   Phone: [phone]    PA Initiation    Medication: ESCITALOPRAM OXALATE 10 MG PO TABS  Insurance Company: [employer] - Phone [phone] Fax [phone]  Pharmacy Filling the Rx: [id number] [id number]  Filling Pharmacy Phone: [phone]  Filling Pharmacy Fax:    Start Date: 11/9/2023

## 2023-11-11 NOTE — TELEPHONE ENCOUNTER
PRIOR AUTHORIZATION DENIED    Medication: ESCITALOPRAM OXALATE 10 MG PO TABS  Insurance Company: [employer] - Phone [phone] Fax [phone]  Denial Date: 11/10/2023  Denial Rational:           Appeal Information:         Patient Notified: No

## 2023-11-13 ENCOUNTER — MYC MEDICAL ADVICE (OUTPATIENT)
Dept: FAMILY MEDICINE | Facility: CLINIC | Age: 23
End: 2023-11-13
Payer: COMMERCIAL

## 2023-11-14 RX ORDER — ESCITALOPRAM OXALATE 20 MG/1
20 TABLET ORAL DAILY
Qty: 90 TABLET | Refills: 1 | Status: SHIPPED | OUTPATIENT
Start: 2023-11-14 | End: 2023-12-12

## 2023-11-15 NOTE — TELEPHONE ENCOUNTER
We will change to 20 mg tablets 1 tab p.o. daily instead of 210 mg tablets daily.  New Rx submitted to pharmacy.

## 2023-12-05 ASSESSMENT — ANXIETY QUESTIONNAIRES
GAD7 TOTAL SCORE: 14
6. BECOMING EASILY ANNOYED OR IRRITABLE: NEARLY EVERY DAY
3. WORRYING TOO MUCH ABOUT DIFFERENT THINGS: MORE THAN HALF THE DAYS
4. TROUBLE RELAXING: MORE THAN HALF THE DAYS
1. FEELING NERVOUS, ANXIOUS, OR ON EDGE: MORE THAN HALF THE DAYS
5. BEING SO RESTLESS THAT IT IS HARD TO SIT STILL: SEVERAL DAYS
2. NOT BEING ABLE TO STOP OR CONTROL WORRYING: MORE THAN HALF THE DAYS
7. FEELING AFRAID AS IF SOMETHING AWFUL MIGHT HAPPEN: MORE THAN HALF THE DAYS
IF YOU CHECKED OFF ANY PROBLEMS ON THIS QUESTIONNAIRE, HOW DIFFICULT HAVE THESE PROBLEMS MADE IT FOR YOU TO DO YOUR WORK, TAKE CARE OF THINGS AT HOME, OR GET ALONG WITH OTHER PEOPLE: VERY DIFFICULT

## 2023-12-06 DIAGNOSIS — F33.0 MILD RECURRENT MAJOR DEPRESSION (H): ICD-10-CM

## 2023-12-06 DIAGNOSIS — F41.9 ANXIETY: ICD-10-CM

## 2023-12-08 RX ORDER — ESCITALOPRAM OXALATE 5 MG/1
5 TABLET ORAL DAILY
Qty: 30 TABLET | Refills: 5 | Status: SHIPPED | OUTPATIENT
Start: 2023-12-08 | End: 2023-12-12 | Stop reason: DRUGHIGH

## 2023-12-12 ENCOUNTER — VIRTUAL VISIT (OUTPATIENT)
Dept: FAMILY MEDICINE | Facility: CLINIC | Age: 23
End: 2023-12-12
Payer: COMMERCIAL

## 2023-12-12 DIAGNOSIS — R45.4 DIFFICULTY CONTROLLING ANGER: ICD-10-CM

## 2023-12-12 DIAGNOSIS — Z23 NEED FOR IMMUNIZATION AGAINST INFLUENZA: ICD-10-CM

## 2023-12-12 DIAGNOSIS — Z23 NEED FOR TDAP VACCINATION: ICD-10-CM

## 2023-12-12 DIAGNOSIS — F33.0 MILD RECURRENT MAJOR DEPRESSION (H): ICD-10-CM

## 2023-12-12 DIAGNOSIS — Z23 NEED FOR COVID-19 VACCINE: ICD-10-CM

## 2023-12-12 DIAGNOSIS — F41.9 ANXIETY: Primary | ICD-10-CM

## 2023-12-12 PROCEDURE — 99213 OFFICE O/P EST LOW 20 MIN: CPT | Mod: VID | Performed by: FAMILY MEDICINE

## 2023-12-12 RX ORDER — ESCITALOPRAM OXALATE 20 MG/1
20 TABLET ORAL DAILY
Qty: 90 TABLET | Refills: 1 | Status: SHIPPED | OUTPATIENT
Start: 2023-12-12 | End: 2024-05-10

## 2023-12-12 ASSESSMENT — ANXIETY QUESTIONNAIRES
7. FEELING AFRAID AS IF SOMETHING AWFUL MIGHT HAPPEN: MORE THAN HALF THE DAYS
3. WORRYING TOO MUCH ABOUT DIFFERENT THINGS: SEVERAL DAYS
6. BECOMING EASILY ANNOYED OR IRRITABLE: SEVERAL DAYS
5. BEING SO RESTLESS THAT IT IS HARD TO SIT STILL: NOT AT ALL
GAD7 TOTAL SCORE: 6
IF YOU CHECKED OFF ANY PROBLEMS ON THIS QUESTIONNAIRE, HOW DIFFICULT HAVE THESE PROBLEMS MADE IT FOR YOU TO DO YOUR WORK, TAKE CARE OF THINGS AT HOME, OR GET ALONG WITH OTHER PEOPLE: VERY DIFFICULT
4. TROUBLE RELAXING: NOT AT ALL
2. NOT BEING ABLE TO STOP OR CONTROL WORRYING: SEVERAL DAYS
GAD7 TOTAL SCORE: 6
1. FEELING NERVOUS, ANXIOUS, OR ON EDGE: SEVERAL DAYS

## 2023-12-12 ASSESSMENT — PATIENT HEALTH QUESTIONNAIRE - PHQ9
SUM OF ALL RESPONSES TO PHQ QUESTIONS 1-9: 8
SUM OF ALL RESPONSES TO PHQ QUESTIONS 1-9: 8
10. IF YOU CHECKED OFF ANY PROBLEMS, HOW DIFFICULT HAVE THESE PROBLEMS MADE IT FOR YOU TO DO YOUR WORK, TAKE CARE OF THINGS AT HOME, OR GET ALONG WITH OTHER PEOPLE: VERY DIFFICULT

## 2023-12-12 NOTE — PATIENT INSTRUCTIONS
" [facility]  [address] [place] [address]  Office: [phone]   Fax:    [phone]     You can do your vaccinations/immunizations  in any [facility] pharmacy:     To make a pharmacy only appointment online, please go to [email or web address] and select \"Click here to schedule a Vaccination or Blood Pressure Check at available [facility] \" at the bottom of the first page.  You can then select a location, then date and time bubbles that best fits your schedule.          "

## 2023-12-12 NOTE — PROGRESS NOTES
" [facility]  [address] [place] [address]  Office: [phone]   Fax:    [phone]    Video Visit: Fide is a 23 year old who is being evaluated via a billable video visit.      How would you like to obtain your AVS? MyChart  If the video visit is dropped, the invitation should be resent by: Text to cell phone: [phone]  Will anyone else be joining your video visit? No         Assessment & Plan       ICD-10-CM    1. Anxiety  F41.9 escitalopram (LEXAPRO) 20 MG tablet      2. Mild recurrent major depression (H24)  F33.0 escitalopram (LEXAPRO) 20 MG tablet      3. Difficulty controlling anger- not a problem currently at all - counseling and meds really helping  R45.4 escitalopram (LEXAPRO) 20 MG tablet      4. Need for immunization against influenza- will get in pharmacy  Z23       5. Need for Tdap vaccination - - will get in pharmacy  Z23       6. Need for COVID-19 vaccine - - will get in pharmacy  Z23          Return in about 5 months (around 5/24/2024) for Wellness/Preventative Visit, depression, anxiety, w/ Dr. ORTEZ for 40 min appt.            Prescription drug management  20 minutes spent by me on the date of the encounter doing chart review, history and exam, documentation and further activities per the note       BMI:   Estimated body mass index is 21.66 kg/m  as calculated from the following:    Height as of 5/23/23: 1.626 m (5' 4\").    Weight as of 5/23/23: 57.2 kg (126 lb 3.2 oz).       MEDICATIONS:  Continue current medications without change - refills sent to pharmacy.   Regular exercise  Continue therapy/counseling   See Patient Instructions        Future Appointments 12/12/2023 - 6/9/2024        Date Visit Type Length Department Provider     5/24/2024 10:40 AM PREVENTATIVE ADULT 40 min  FAMILY PRACTICE Jenifer Sherwood MD    Location Instructions:     [facility] is located at [address], [address] Free " parking is available; access the lot by turning north from [address] onto [address], then west onto [address].                            Jenifer Sherwood MD  [facility] [place]        Subjective   Denisse Lange 23 year old female , presenting for the following health issues:  Recheck Medication    1. Anxiety    2. Mild recurrent major depression (H24)    3. Difficulty controlling anger- not a problem currently at all - counseling and meds really helping    4. Need for immunization against influenza- will get in pharmacy    5. Need for Tdap vaccination - - will get in pharmacy    6. Need for COVID-19 vaccine - - will get in pharmacy           History of Present Illness       Mental Health Follow-up:  Patient presents to follow-up on Depression & Anxiety.Patient's depression since last visit has been:  Good  The patient is not having other symptoms associated with depression.  Patient's anxiety since last visit has been:  Medium  The patient is not having other symptoms associated with anxiety.  Any significant life events: relationship concerns  Patient is not feeling anxious or having panic attacks.  Patient has no concerns about alcohol or drug use.    She eats 0-1 servings of fruits and vegetables daily.She consumes 1 sweetened beverage(s) daily.She exercises with enough effort to increase her heart rate 20 to 29 minutes per day.  She exercises with enough effort to increase her heart rate 3 or less days per week.   She is taking medications regularly.     Finals week and feeling good about the quarter this fall.  No side effects or problems with her medications.  Feels at peace with current   Has been going to therapy the last couple of weeks - feels like that is really helping to regular her emotions , feelings, depression and anxiety. Feels like 20mg citalopram dose really working well.     Has a great fit with her therapist - seeing them via video every week.     Coming home for the holidays - looking forward to that and seeing her family.     Pt will get her covid-19 and flu shots in pharmacy upon her return this week. Gave website to get at [facility].       Social History     Tobacco Use    Smoking status: Never    Smokeless tobacco: Never    Tobacco comments:     no one smokes in home   Vaping Use    Vaping Use: Never used   Substance Use Topics    Alcohol use: Yes    Drug use: No         8/10/2023     5:58 PM 10/23/2023    11:42 AM 12/12/2023     9:19 AM   PHQ   PHQ-9 Total Score 8 10    10 8   Q9: Thoughts of better off dead/self-harm past 2 weeks Not at all Not at all    Not at all Not at all         11/6/2023     2:57 PM 12/5/2023     5:01 PM 12/12/2023     9:19 AM   BEN-7 SCORE   Total Score 10 (moderate anxiety) 14 (moderate anxiety) 6 (mild anxiety)   Total Score 10 14 6         12/12/2023     9:19 AM   Last PHQ-9   1.  Little interest or pleasure in doing things 1   2.  Feeling down, depressed, or hopeless 2   3.  Trouble falling or staying asleep, or sleeping too much 1   4.  Feeling tired or having little energy 1   5.  Poor appetite or overeating 0   6.  Feeling bad about yourself 2   7.  Trouble concentrating 1   8.  Moving slowly or restless 0   Q9: Thoughts of better off dead/self-harm past 2 weeks 0   PHQ-9 Total Score 8         12/12/2023     9:19 AM   BEN-7    1. Feeling nervous, anxious, or on edge 1   2. Not being able to stop or control worrying 1   3. Worrying too much about different things 1   4. Trouble relaxing 0   5. Being so restless that it is hard to sit still 0   6. Becoming easily annoyed or irritable 1   7. Feeling afraid, as if something awful might happen 2   BEN-7 Total Score 6   If you checked any problems, how difficult have they made it for you to do your work, take care of things at home, or get along with other people? 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        Vitals:  No vitals were obtained today due to virtual visit.    @[facility] HospitalEXAMBYAGE@   GENERAL: Healthy, alert and no distress  EYES: Eyes grossly normal to inspection.  No discharge or erythema, or obvious scleral/conjunctival abnormalities.  RESP: No audible wheeze, cough, or visible cyanosis.  No visible retractions or increased work of breathing.    SKIN: Visible skin clear. No significant rash, abnormal pigmentation or lesions.  NEURO: Cranial nerves grossly intact.  Mentation and speech appropriate for age.  PSYCH: Mentation appears normal, affect normal/bright, judgement and insight intact, normal speech and appearance well-groomed.    Office Visit on 05/23/2023   Component Date Value Ref Range Status    Interpretation 05/23/2023 Negative for Intraepithelial Lesion or Malignancy (NILM)    Final    Comment 05/23/2023    Final                    Value:This result contains rich text formatting which cannot be displayed here.    Specimen Adequacy 05/23/2023 Satisfactory for evaluation, endocervical/transformation zone component present   Final    Clinical Information 05/23/2023    Final                    Value:This result contains rich text formatting which cannot be displayed here.    LMP/Menopause Date 05/23/2023    Final                    Value:This result contains rich text formatting  which cannot be displayed here.    Reflex Testing 05/23/2023 No   Final    Previous Abnormal? 05/23/2023    Final                    Value:This result contains rich text formatting which cannot be displayed here.    Performing Labs 05/23/2023    Final                    Value:This result contains rich text formatting which cannot be displayed here.    Neisseria gonorrhoeae 05/23/2023 Negative  Negative Final    Negative for N. gonorrhoeae rRNA by transcription mediated amplification. A negative result by transcription mediated amplification does not preclude the presence of C. trachomatis infection because results are dependent on proper and adequate collection, absence of inhibitors and sufficient rRNA to be detected.    Chlamydia trachomatis 05/23/2023 Negative  Negative Final    A negative result by transcription mediated amplification does not preclude the presence of C. trachomatis infection because results are dependent on proper and adequate collection, absence of inhibitors and sufficient rRNA to be detected.    hCG Urine Qualitative 05/23/2023 Negative  Negative Final    This test is for screening purposes.  Results should be interpreted along with the clinical picture.  Confirmation testing is available if warranted by ordering ALW010, HCG Quantitative Pregnancy.    Hemoglobin 05/23/2023 13.3  11.7 - 15.7 g/dL Final               Video-Visit Details    Type of service:  Video Visit   Video Start Time: 9:59 AM  Video End Time:10:08 AM    Originating Location (pt. Location): Home    Distant Location (provider location):  On-site  Platform used for Video Visit: [employer]

## 2023-12-13 ASSESSMENT — ANXIETY QUESTIONNAIRES: GAD7 TOTAL SCORE: 6

## 2024-04-23 ENCOUNTER — PATIENT OUTREACH (OUTPATIENT)
Dept: CARE COORDINATION | Facility: CLINIC | Age: 24
End: 2024-04-23

## 2024-05-03 DIAGNOSIS — Z30.011 ENCOUNTER FOR INITIAL PRESCRIPTION OF CONTRACEPTIVE PILLS: ICD-10-CM

## 2024-05-03 DIAGNOSIS — N94.6 DYSMENORRHEA: ICD-10-CM

## 2024-05-07 ENCOUNTER — PATIENT OUTREACH (OUTPATIENT)
Dept: CARE COORDINATION | Facility: CLINIC | Age: 24
End: 2024-05-07

## 2024-05-09 DIAGNOSIS — F33.0 MILD RECURRENT MAJOR DEPRESSION (H): ICD-10-CM

## 2024-05-09 DIAGNOSIS — R45.4 DIFFICULTY CONTROLLING ANGER: ICD-10-CM

## 2024-05-09 DIAGNOSIS — F41.9 ANXIETY: ICD-10-CM

## 2024-05-10 RX ORDER — ESCITALOPRAM OXALATE 20 MG/1
20 TABLET ORAL DAILY
Qty: 90 TABLET | Refills: 1 | Status: SHIPPED | OUTPATIENT
Start: 2024-05-10

## 2024-08-31 ENCOUNTER — HEALTH MAINTENANCE LETTER (OUTPATIENT)
Age: 24
End: 2024-08-31

## 2024-11-03 DIAGNOSIS — R45.4 DIFFICULTY CONTROLLING ANGER: ICD-10-CM

## 2024-11-03 DIAGNOSIS — F41.9 ANXIETY: ICD-10-CM

## 2024-11-03 DIAGNOSIS — F33.0 MILD RECURRENT MAJOR DEPRESSION (H): ICD-10-CM

## 2024-11-04 RX ORDER — ESCITALOPRAM OXALATE 20 MG/1
20 TABLET ORAL DAILY
Qty: 90 TABLET | Refills: 0 | Status: SHIPPED | OUTPATIENT
Start: 2024-11-04

## 2024-11-04 NOTE — TELEPHONE ENCOUNTER
Pt due for in person  visit . Please assist pt in making appt(s) for the above.   OverDue for annual exam as well Please assist pt in making appt(s) for the above.   Ok for virtual visit in the next couple of weeks - video to renew pt's meds, then needs px before the end of the year, please. Ok for 2-20min visits back to back.         12/12/2023     9:19 AM   Last PHQ-9   1.  Little interest or pleasure in doing things 1    2.  Feeling down, depressed, or hopeless 2    3.  Trouble falling or staying asleep, or sleeping too much 1    4.  Feeling tired or having little energy 1    5.  Poor appetite or overeating 0    6.  Feeling bad about yourself 2    7.  Trouble concentrating 1    8.  Moving slowly or restless 0    Q9: Thoughts of better off dead/self-harm past 2 weeks 0    PHQ-9 Total Score 8       Patient-reported         12/12/2023     9:19 AM   BEN-7    1. Feeling nervous, anxious, or on edge 1    2. Not being able to stop or control worrying 1    3. Worrying too much about different things 1    4. Trouble relaxing 0    5. Being so restless that it is hard to sit still 0    6. Becoming easily annoyed or irritable 1    7. Feeling afraid, as if something awful might happen 2    BEN-7 Total Score 6   If you checked any problems, how difficult have they made it for you to do your work, take care of things at home, or get along with other people? Very difficult        Patient-reported

## 2024-11-09 ASSESSMENT — PATIENT HEALTH QUESTIONNAIRE - PHQ9: SUM OF ALL RESPONSES TO PHQ QUESTIONS 1-9: 8

## 2024-11-19 ENCOUNTER — PATIENT OUTREACH (OUTPATIENT)
Dept: CARE COORDINATION | Facility: CLINIC | Age: 24
End: 2024-11-19

## 2024-12-16 ENCOUNTER — TELEPHONE (OUTPATIENT)
Dept: FAMILY MEDICINE | Facility: CLINIC | Age: 24
End: 2024-12-16

## 2024-12-16 DIAGNOSIS — F33.0 MILD RECURRENT MAJOR DEPRESSION (H): ICD-10-CM

## 2024-12-16 DIAGNOSIS — R45.4 DIFFICULTY CONTROLLING ANGER: ICD-10-CM

## 2024-12-16 DIAGNOSIS — F41.9 ANXIETY: ICD-10-CM

## 2024-12-16 NOTE — TELEPHONE ENCOUNTER
Medication Question or Refill        What medication are you calling about (include dose and sig)?:     escitalopram (LEXAPRO) 20 MG tablet   Pt is requesting a 90 day refill amount.    Scheduled appt 1/15/24    Preferred Pharmacy:       [id number] - [place], [place] - [address]  [address] [facility] [address]  Phone: [phone] Fax: [phone]    Controlled Substance Agreement on file:   CSA -- Patient Level:    CSA: None found at the patient level.       Who prescribed the medication?: Dr. Sherwood    Do you need a refill? Yes    When did you use the medication last? na    Patient offered an appointment? No    Do you have any questions or concerns?  No      [facility] or would you prefer to receive a phone call?:   leoncio DOWNS

## 2024-12-19 RX ORDER — ESCITALOPRAM OXALATE 20 MG/1
20 TABLET ORAL DAILY
Qty: 90 TABLET | Refills: 0 | Status: SHIPPED | OUTPATIENT
Start: 2024-12-19

## 2025-03-20 ENCOUNTER — MYC REFILL (OUTPATIENT)
Dept: FAMILY MEDICINE | Facility: CLINIC | Age: 25
End: 2025-03-20

## 2025-03-20 DIAGNOSIS — F33.0 MILD RECURRENT MAJOR DEPRESSION: ICD-10-CM

## 2025-03-20 DIAGNOSIS — N94.6 DYSMENORRHEA: ICD-10-CM

## 2025-03-20 DIAGNOSIS — F41.9 ANXIETY: ICD-10-CM

## 2025-03-20 DIAGNOSIS — Z30.011 ENCOUNTER FOR INITIAL PRESCRIPTION OF CONTRACEPTIVE PILLS: ICD-10-CM

## 2025-03-20 DIAGNOSIS — R45.4 DIFFICULTY CONTROLLING ANGER: ICD-10-CM

## 2025-03-22 NOTE — TELEPHONE ENCOUNTER
Ok for max of 90 days on both these meds until pt can get a provider in [place]. No further rx's without pt being seen here.   Please inform patient and ask what pharmacy she'd like these sent to.

## 2025-03-24 RX ORDER — ESCITALOPRAM OXALATE 20 MG/1
20 TABLET ORAL DAILY
Qty: 90 TABLET | Refills: 0 | Status: SHIPPED | OUTPATIENT
Start: 2025-03-24

## 2025-03-24 RX ORDER — LEVONORGESTREL/ETHIN.ESTRADIOL 0.1-0.02MG
1 TABLET ORAL DAILY
Qty: 84 TABLET | Refills: 0 | Status: SHIPPED | OUTPATIENT
Start: 2025-03-24

## 2025-03-24 NOTE — TELEPHONE ENCOUNTER
Called #   Telephone Information:   Mobile [phone]       Advised pt on the information below     Patient stated an understanding and agreed with plan.    Sent scripts to pharmacy requested     Judith Mcgee RN, BSN  [facility]

## 2025-06-22 DIAGNOSIS — N94.6 DYSMENORRHEA: ICD-10-CM

## 2025-06-22 DIAGNOSIS — Z30.011 ENCOUNTER FOR INITIAL PRESCRIPTION OF CONTRACEPTIVE PILLS: ICD-10-CM

## 2025-06-24 RX ORDER — LEVONORGESTREL AND ETHINYL ESTRADIOL 0.1-0.02MG
1 KIT ORAL DAILY
Qty: 84 TABLET | Refills: 0 | OUTPATIENT
Start: 2025-06-24

## 2025-06-24 NOTE — TELEPHONE ENCOUNTER
Cannot refill.   Pt hasn't been seen here since 2023.     BP Readings from Last 3 Encounters:   05/23/23 106/73   12/23/21 104/71   12/18/19 100/60 (14%, Z = -1.08 /  28%, Z = -0.58)*       Per last refill given on 3/2025:       3/22/25  5:41 PM  [facility]    3/22/25  5:40 PM  Note  Ok for max of 90 days on both these meds until pt can get a provider in [place]. No further rx's without pt being seen here.   Please inform patient and ask what pharmacy she'd like these sent to.         Judith Mcgee, RN      3/24/25  9:59 AM  Note            Called #       Telephone Information:   Mobile [phone]         Advised pt on the information below      Patient stated an understanding and agreed with plan.     Sent scripts to pharmacy requested      Judith Mcgee RN, BSN  [facility]

## 2025-06-24 NOTE — TELEPHONE ENCOUNTER
Attempt # 1    Called # [phone]     Left a non detailed VM to call back at [phone] and ask for any available Triage Nurse.    Janine Ochoa RN on 6/24/2025 at 11:22 AM   [facility]

## 2025-06-25 ENCOUNTER — MYC MEDICAL ADVICE (OUTPATIENT)
Dept: FAMILY MEDICINE | Facility: CLINIC | Age: 25
End: 2025-06-25

## 2025-06-26 NOTE — TELEPHONE ENCOUNTER
Attempt # 2    Called # [phone]     Left a non detailed VM to call back at [phone] and ask for any available Triage Nurse.    Janine Ochoa RN on 6/26/2025 at 8:01 AM   [facility]

## 2025-06-26 NOTE — TELEPHONE ENCOUNTER
Patient called- explained providers note/refill denial.  she is in [place] now.   [facility]    Patient states understanding and denied ?'s